# Patient Record
Sex: MALE | Race: BLACK OR AFRICAN AMERICAN | Employment: UNEMPLOYED | URBAN - METROPOLITAN AREA
[De-identification: names, ages, dates, MRNs, and addresses within clinical notes are randomized per-mention and may not be internally consistent; named-entity substitution may affect disease eponyms.]

---

## 2021-01-01 ENCOUNTER — APPOINTMENT (OUTPATIENT)
Dept: GENERAL RADIOLOGY | Age: 76
DRG: 469 | End: 2021-01-01
Attending: STUDENT IN AN ORGANIZED HEALTH CARE EDUCATION/TRAINING PROGRAM
Payer: MEDICAID

## 2021-01-01 ENCOUNTER — APPOINTMENT (OUTPATIENT)
Dept: GENERAL RADIOLOGY | Age: 76
DRG: 469 | End: 2021-01-01
Attending: EMERGENCY MEDICINE
Payer: MEDICAID

## 2021-01-01 ENCOUNTER — APPOINTMENT (OUTPATIENT)
Dept: GENERAL RADIOLOGY | Age: 76
DRG: 469 | End: 2021-01-01
Attending: INTERNAL MEDICINE
Payer: MEDICAID

## 2021-01-01 ENCOUNTER — HOSPITAL ENCOUNTER (INPATIENT)
Age: 76
LOS: 6 days | DRG: 469 | End: 2021-09-19
Attending: STUDENT IN AN ORGANIZED HEALTH CARE EDUCATION/TRAINING PROGRAM | Admitting: HOSPITALIST
Payer: MEDICAID

## 2021-01-01 ENCOUNTER — APPOINTMENT (OUTPATIENT)
Dept: GENERAL RADIOLOGY | Age: 76
DRG: 469 | End: 2021-01-01
Attending: HOSPITALIST
Payer: MEDICAID

## 2021-01-01 ENCOUNTER — APPOINTMENT (OUTPATIENT)
Dept: ULTRASOUND IMAGING | Age: 76
DRG: 469 | End: 2021-01-01
Attending: HOSPITALIST
Payer: MEDICAID

## 2021-01-01 ENCOUNTER — APPOINTMENT (OUTPATIENT)
Dept: ULTRASOUND IMAGING | Age: 76
DRG: 469 | End: 2021-01-01
Attending: INTERNAL MEDICINE
Payer: MEDICAID

## 2021-01-01 VITALS
SYSTOLIC BLOOD PRESSURE: 152 MMHG | HEART RATE: 101 BPM | HEIGHT: 68 IN | RESPIRATION RATE: 14 BRPM | BODY MASS INDEX: 14.87 KG/M2 | TEMPERATURE: 97.4 F | OXYGEN SATURATION: 74 % | WEIGHT: 98.11 LBS | DIASTOLIC BLOOD PRESSURE: 102 MMHG

## 2021-01-01 DIAGNOSIS — T68.XXXA HYPOTHERMIA, INITIAL ENCOUNTER: ICD-10-CM

## 2021-01-01 DIAGNOSIS — R62.7 FAILURE TO THRIVE IN ADULT: ICD-10-CM

## 2021-01-01 DIAGNOSIS — R74.01 TRANSAMINITIS: Primary | ICD-10-CM

## 2021-01-01 DIAGNOSIS — E16.2 HYPOGLYCEMIA: ICD-10-CM

## 2021-01-01 LAB
ALBUMIN SERPL-MCNC: 1.4 G/DL (ref 3.5–5)
ALBUMIN SERPL-MCNC: 1.5 G/DL (ref 3.5–5)
ALBUMIN SERPL-MCNC: 1.5 G/DL (ref 3.5–5)
ALBUMIN SERPL-MCNC: 1.8 G/DL (ref 3.5–5)
ALBUMIN SERPL-MCNC: 1.9 G/DL (ref 3.5–5)
ALBUMIN SERPL-MCNC: 2 G/DL (ref 3.5–5)
ALBUMIN/GLOB SERPL: 0.4 {RATIO} (ref 1.1–2.2)
ALBUMIN/GLOB SERPL: 0.5 {RATIO} (ref 1.1–2.2)
ALP SERPL-CCNC: 1186 U/L (ref 45–117)
ALP SERPL-CCNC: 1303 U/L (ref 45–117)
ALP SERPL-CCNC: 526 U/L (ref 45–117)
ALP SERPL-CCNC: 582 U/L (ref 45–117)
ALP SERPL-CCNC: 616 U/L (ref 45–117)
ALP SERPL-CCNC: 626 U/L (ref 45–117)
ALP SERPL-CCNC: 740 U/L (ref 45–117)
ALP SERPL-CCNC: 954 U/L (ref 45–117)
ALT SERPL-CCNC: 150 U/L (ref 12–78)
ALT SERPL-CCNC: 177 U/L (ref 12–78)
ALT SERPL-CCNC: 180 U/L (ref 12–78)
ALT SERPL-CCNC: 215 U/L (ref 12–78)
ALT SERPL-CCNC: 299 U/L (ref 12–78)
ALT SERPL-CCNC: 569 U/L (ref 12–78)
ALT SERPL-CCNC: 755 U/L (ref 12–78)
ALT SERPL-CCNC: 928 U/L (ref 12–78)
ANION GAP SERPL CALC-SCNC: 10 MMOL/L (ref 5–15)
ANION GAP SERPL CALC-SCNC: 10 MMOL/L (ref 5–15)
ANION GAP SERPL CALC-SCNC: 12 MMOL/L (ref 5–15)
ANION GAP SERPL CALC-SCNC: 13 MMOL/L (ref 5–15)
ANION GAP SERPL CALC-SCNC: 9 MMOL/L (ref 5–15)
APPEARANCE UR: ABNORMAL
AST SERPL-CCNC: 1284 U/L (ref 15–37)
AST SERPL-CCNC: 192 U/L (ref 15–37)
AST SERPL-CCNC: 261 U/L (ref 15–37)
AST SERPL-CCNC: 271 U/L (ref 15–37)
AST SERPL-CCNC: 333 U/L (ref 15–37)
AST SERPL-CCNC: 553 U/L (ref 15–37)
AST SERPL-CCNC: >2000 U/L (ref 15–37)
AST SERPL-CCNC: >2000 U/L (ref 15–37)
ATRIAL RATE: 67 BPM
BACTERIA SPEC CULT: ABNORMAL
BACTERIA SPEC CULT: ABNORMAL
BACTERIA SPEC CULT: NORMAL
BACTERIA URNS QL MICRO: ABNORMAL /HPF
BASOPHILS # BLD: 0 K/UL (ref 0–0.1)
BASOPHILS # BLD: 0 K/UL (ref 0–0.1)
BASOPHILS NFR BLD: 0 % (ref 0–1)
BASOPHILS NFR BLD: 0 % (ref 0–1)
BILIRUB DIRECT SERPL-MCNC: 0.2 MG/DL (ref 0–0.2)
BILIRUB DIRECT SERPL-MCNC: <0.1 MG/DL (ref 0–0.2)
BILIRUB SERPL-MCNC: 0.2 MG/DL (ref 0.2–1)
BILIRUB SERPL-MCNC: 0.4 MG/DL (ref 0.2–1)
BILIRUB SERPL-MCNC: 0.5 MG/DL (ref 0.2–1)
BILIRUB UR QL: NEGATIVE
BUN SERPL-MCNC: 122 MG/DL (ref 6–20)
BUN SERPL-MCNC: 122 MG/DL (ref 6–20)
BUN SERPL-MCNC: 126 MG/DL (ref 6–20)
BUN SERPL-MCNC: 128 MG/DL (ref 6–20)
BUN SERPL-MCNC: 128 MG/DL (ref 6–20)
BUN SERPL-MCNC: 132 MG/DL (ref 6–20)
BUN SERPL-MCNC: 133 MG/DL (ref 6–20)
BUN SERPL-MCNC: 134 MG/DL (ref 6–20)
BUN/CREAT SERPL: 28 (ref 12–20)
BUN/CREAT SERPL: 29 (ref 12–20)
BUN/CREAT SERPL: 30 (ref 12–20)
BUN/CREAT SERPL: 30 (ref 12–20)
BUN/CREAT SERPL: 31 (ref 12–20)
BUN/CREAT SERPL: 31 (ref 12–20)
C DIFF GDH STL QL: NEGATIVE
C DIFF TOX A+B STL QL IA: NEGATIVE
CALCIUM SERPL-MCNC: 6.1 MG/DL (ref 8.5–10.1)
CALCIUM SERPL-MCNC: 6.1 MG/DL (ref 8.5–10.1)
CALCIUM SERPL-MCNC: 6.2 MG/DL (ref 8.5–10.1)
CALCIUM SERPL-MCNC: 6.7 MG/DL (ref 8.5–10.1)
CALCIUM SERPL-MCNC: 6.8 MG/DL (ref 8.5–10.1)
CALCIUM SERPL-MCNC: 6.9 MG/DL (ref 8.5–10.1)
CALCULATED P AXIS, ECG09: 60 DEGREES
CALCULATED R AXIS, ECG10: 81 DEGREES
CALCULATED T AXIS, ECG11: 93 DEGREES
CC UR VC: ABNORMAL
CHLORIDE SERPL-SCNC: 114 MMOL/L (ref 97–108)
CHLORIDE SERPL-SCNC: 114 MMOL/L (ref 97–108)
CHLORIDE SERPL-SCNC: 115 MMOL/L (ref 97–108)
CHLORIDE SERPL-SCNC: 116 MMOL/L (ref 97–108)
CHLORIDE SERPL-SCNC: 118 MMOL/L (ref 97–108)
CHLORIDE SERPL-SCNC: 120 MMOL/L (ref 97–108)
CHLORIDE SERPL-SCNC: 124 MMOL/L (ref 97–108)
CHLORIDE SERPL-SCNC: 127 MMOL/L (ref 97–108)
CK SERPL-CCNC: 524 U/L (ref 39–308)
CO2 SERPL-SCNC: 15 MMOL/L (ref 21–32)
CO2 SERPL-SCNC: 17 MMOL/L (ref 21–32)
CO2 SERPL-SCNC: 18 MMOL/L (ref 21–32)
CO2 SERPL-SCNC: 20 MMOL/L (ref 21–32)
COLOR UR: ABNORMAL
COMMENT, HOLDF: NORMAL
CORTIS SERPL-MCNC: 35.3 UG/DL
COVID-19 RAPID TEST, COVR: NOT DETECTED
CREAT SERPL-MCNC: 4.01 MG/DL (ref 0.7–1.3)
CREAT SERPL-MCNC: 4.23 MG/DL (ref 0.7–1.3)
CREAT SERPL-MCNC: 4.27 MG/DL (ref 0.7–1.3)
CREAT SERPL-MCNC: 4.29 MG/DL (ref 0.7–1.3)
CREAT SERPL-MCNC: 4.39 MG/DL (ref 0.7–1.3)
CREAT SERPL-MCNC: 4.41 MG/DL (ref 0.7–1.3)
CREAT SERPL-MCNC: 4.42 MG/DL (ref 0.7–1.3)
CREAT SERPL-MCNC: 4.45 MG/DL (ref 0.7–1.3)
DIAGNOSIS, 93000: NORMAL
DIFFERENTIAL METHOD BLD: ABNORMAL
DIFFERENTIAL METHOD BLD: ABNORMAL
EOSINOPHIL # BLD: 0 K/UL (ref 0–0.4)
EOSINOPHIL # BLD: 0.1 K/UL (ref 0–0.4)
EOSINOPHIL NFR BLD: 0 % (ref 0–7)
EOSINOPHIL NFR BLD: 2 % (ref 0–7)
EPITH CASTS URNS QL MICRO: ABNORMAL /LPF
ERYTHROCYTE [DISTWIDTH] IN BLOOD BY AUTOMATED COUNT: 14.7 % (ref 11.5–14.5)
ERYTHROCYTE [DISTWIDTH] IN BLOOD BY AUTOMATED COUNT: 14.9 % (ref 11.5–14.5)
ERYTHROCYTE [DISTWIDTH] IN BLOOD BY AUTOMATED COUNT: 15.3 % (ref 11.5–14.5)
GLOBULIN SER CALC-MCNC: 3.7 G/DL (ref 2–4)
GLOBULIN SER CALC-MCNC: 3.8 G/DL (ref 2–4)
GLOBULIN SER CALC-MCNC: 3.9 G/DL (ref 2–4)
GLOBULIN SER CALC-MCNC: 4 G/DL (ref 2–4)
GLOBULIN SER CALC-MCNC: 4.1 G/DL (ref 2–4)
GLOBULIN SER CALC-MCNC: 4.4 G/DL (ref 2–4)
GLUCOSE BLD STRIP.AUTO-MCNC: 100 MG/DL (ref 65–117)
GLUCOSE BLD STRIP.AUTO-MCNC: 103 MG/DL (ref 65–117)
GLUCOSE BLD STRIP.AUTO-MCNC: 103 MG/DL (ref 65–117)
GLUCOSE BLD STRIP.AUTO-MCNC: 108 MG/DL (ref 65–117)
GLUCOSE BLD STRIP.AUTO-MCNC: 110 MG/DL (ref 65–117)
GLUCOSE BLD STRIP.AUTO-MCNC: 112 MG/DL (ref 65–117)
GLUCOSE BLD STRIP.AUTO-MCNC: 116 MG/DL (ref 65–117)
GLUCOSE BLD STRIP.AUTO-MCNC: 122 MG/DL (ref 65–117)
GLUCOSE BLD STRIP.AUTO-MCNC: 123 MG/DL (ref 65–117)
GLUCOSE BLD STRIP.AUTO-MCNC: 153 MG/DL (ref 65–117)
GLUCOSE BLD STRIP.AUTO-MCNC: 17 MG/DL (ref 65–117)
GLUCOSE BLD STRIP.AUTO-MCNC: 177 MG/DL (ref 65–117)
GLUCOSE BLD STRIP.AUTO-MCNC: 207 MG/DL (ref 65–117)
GLUCOSE BLD STRIP.AUTO-MCNC: 25 MG/DL (ref 65–117)
GLUCOSE BLD STRIP.AUTO-MCNC: 44 MG/DL (ref 65–117)
GLUCOSE BLD STRIP.AUTO-MCNC: 45 MG/DL (ref 65–117)
GLUCOSE BLD STRIP.AUTO-MCNC: 45 MG/DL (ref 65–117)
GLUCOSE BLD STRIP.AUTO-MCNC: 53 MG/DL (ref 65–117)
GLUCOSE BLD STRIP.AUTO-MCNC: 54 MG/DL (ref 65–117)
GLUCOSE BLD STRIP.AUTO-MCNC: 56 MG/DL (ref 65–117)
GLUCOSE BLD STRIP.AUTO-MCNC: 57 MG/DL (ref 65–117)
GLUCOSE BLD STRIP.AUTO-MCNC: 57 MG/DL (ref 65–117)
GLUCOSE BLD STRIP.AUTO-MCNC: 63 MG/DL (ref 65–117)
GLUCOSE BLD STRIP.AUTO-MCNC: 65 MG/DL (ref 65–117)
GLUCOSE BLD STRIP.AUTO-MCNC: 66 MG/DL (ref 65–117)
GLUCOSE BLD STRIP.AUTO-MCNC: 68 MG/DL (ref 65–117)
GLUCOSE BLD STRIP.AUTO-MCNC: 76 MG/DL (ref 65–117)
GLUCOSE BLD STRIP.AUTO-MCNC: 78 MG/DL (ref 65–117)
GLUCOSE BLD STRIP.AUTO-MCNC: 79 MG/DL (ref 65–117)
GLUCOSE BLD STRIP.AUTO-MCNC: 80 MG/DL (ref 65–117)
GLUCOSE BLD STRIP.AUTO-MCNC: 81 MG/DL (ref 65–117)
GLUCOSE BLD STRIP.AUTO-MCNC: 83 MG/DL (ref 65–117)
GLUCOSE BLD STRIP.AUTO-MCNC: 84 MG/DL (ref 65–117)
GLUCOSE BLD STRIP.AUTO-MCNC: 85 MG/DL (ref 65–117)
GLUCOSE BLD STRIP.AUTO-MCNC: 87 MG/DL (ref 65–117)
GLUCOSE BLD STRIP.AUTO-MCNC: 89 MG/DL (ref 65–117)
GLUCOSE BLD STRIP.AUTO-MCNC: 91 MG/DL (ref 65–117)
GLUCOSE BLD STRIP.AUTO-MCNC: 96 MG/DL (ref 65–117)
GLUCOSE BLD STRIP.AUTO-MCNC: 99 MG/DL (ref 65–117)
GLUCOSE BLD STRIP.AUTO-MCNC: 99 MG/DL (ref 65–117)
GLUCOSE SERPL-MCNC: 13 MG/DL (ref 65–100)
GLUCOSE SERPL-MCNC: 133 MG/DL (ref 65–100)
GLUCOSE SERPL-MCNC: 134 MG/DL (ref 65–100)
GLUCOSE SERPL-MCNC: 152 MG/DL (ref 65–100)
GLUCOSE SERPL-MCNC: 157 MG/DL (ref 65–100)
GLUCOSE SERPL-MCNC: 75 MG/DL (ref 65–100)
GLUCOSE SERPL-MCNC: 80 MG/DL (ref 65–100)
GLUCOSE SERPL-MCNC: 88 MG/DL (ref 65–100)
GLUCOSE UR STRIP.AUTO-MCNC: NEGATIVE MG/DL
HCT VFR BLD AUTO: 25.2 % (ref 36.6–50.3)
HCT VFR BLD AUTO: 26 % (ref 36.6–50.3)
HCT VFR BLD AUTO: 26.2 % (ref 36.6–50.3)
HCT VFR BLD AUTO: 26.3 % (ref 36.6–50.3)
HCT VFR BLD AUTO: 26.5 % (ref 36.6–50.3)
HGB BLD-MCNC: 8.2 G/DL (ref 12.1–17)
HGB BLD-MCNC: 8.5 G/DL (ref 12.1–17)
HGB BLD-MCNC: 8.6 G/DL (ref 12.1–17)
HGB BLD-MCNC: 8.6 G/DL (ref 12.1–17)
HGB BLD-MCNC: 8.7 G/DL (ref 12.1–17)
HGB UR QL STRIP: ABNORMAL
IMM GRANULOCYTES # BLD AUTO: 0 K/UL
IMM GRANULOCYTES # BLD AUTO: 0 K/UL
IMM GRANULOCYTES NFR BLD AUTO: 0 %
IMM GRANULOCYTES NFR BLD AUTO: 0 %
INR PPP: 1.2 (ref 0.9–1.1)
INTERPRETATION: NORMAL
KETONES UR QL STRIP.AUTO: NEGATIVE MG/DL
LACTATE BLD-SCNC: 0.57 MMOL/L (ref 0.4–2)
LACTATE SERPL-SCNC: 1 MMOL/L (ref 0.4–2)
LDH SERPL L TO P-CCNC: 1563 U/L (ref 85–241)
LEUKOCYTE ESTERASE UR QL STRIP.AUTO: ABNORMAL
LYMPHOCYTES # BLD: 0.3 K/UL (ref 0.8–3.5)
LYMPHOCYTES # BLD: 0.4 K/UL (ref 0.8–3.5)
LYMPHOCYTES NFR BLD: 5 % (ref 12–49)
LYMPHOCYTES NFR BLD: 6 % (ref 12–49)
MAGNESIUM SERPL-MCNC: 2 MG/DL (ref 1.6–2.4)
MAGNESIUM SERPL-MCNC: 2 MG/DL (ref 1.6–2.4)
MAGNESIUM SERPL-MCNC: 2.1 MG/DL (ref 1.6–2.4)
MAGNESIUM SERPL-MCNC: 2.4 MG/DL (ref 1.6–2.4)
MCH RBC QN AUTO: 27.6 PG (ref 26–34)
MCH RBC QN AUTO: 27.8 PG (ref 26–34)
MCH RBC QN AUTO: 27.9 PG (ref 26–34)
MCH RBC QN AUTO: 28 PG (ref 26–34)
MCH RBC QN AUTO: 28.1 PG (ref 26–34)
MCHC RBC AUTO-ENTMCNC: 32.5 G/DL (ref 30–36.5)
MCHC RBC AUTO-ENTMCNC: 32.5 G/DL (ref 30–36.5)
MCHC RBC AUTO-ENTMCNC: 32.7 G/DL (ref 30–36.5)
MCHC RBC AUTO-ENTMCNC: 32.8 G/DL (ref 30–36.5)
MCHC RBC AUTO-ENTMCNC: 33.1 G/DL (ref 30–36.5)
MCV RBC AUTO: 84.6 FL (ref 80–99)
MCV RBC AUTO: 84.8 FL (ref 80–99)
MCV RBC AUTO: 84.8 FL (ref 80–99)
MCV RBC AUTO: 85.2 FL (ref 80–99)
MCV RBC AUTO: 86.6 FL (ref 80–99)
MONOCYTES # BLD: 0.1 K/UL (ref 0–1)
MONOCYTES # BLD: 0.2 K/UL (ref 0–1)
MONOCYTES NFR BLD: 2 % (ref 5–13)
MONOCYTES NFR BLD: 3 % (ref 5–13)
NEUTS BAND NFR BLD MANUAL: 2 % (ref 0–6)
NEUTS BAND NFR BLD MANUAL: 4 % (ref 0–6)
NEUTS SEG # BLD: 4.9 K/UL (ref 1.8–8)
NEUTS SEG # BLD: 6.2 K/UL (ref 1.8–8)
NEUTS SEG NFR BLD: 87 % (ref 32–75)
NEUTS SEG NFR BLD: 89 % (ref 32–75)
NITRITE UR QL STRIP.AUTO: POSITIVE
NRBC # BLD: 0 K/UL (ref 0–0.01)
NRBC # BLD: 0.03 K/UL (ref 0–0.01)
NRBC # BLD: 0.04 K/UL (ref 0–0.01)
NRBC # BLD: 0.05 K/UL (ref 0–0.01)
NRBC # BLD: 0.06 K/UL (ref 0–0.01)
NRBC BLD-RTO: 0 PER 100 WBC
NRBC BLD-RTO: 0.2 PER 100 WBC
NRBC BLD-RTO: 0.5 PER 100 WBC
NRBC BLD-RTO: 0.6 PER 100 WBC
NRBC BLD-RTO: 1.1 PER 100 WBC
P-R INTERVAL, ECG05: 184 MS
PH UR STRIP: 7 [PH] (ref 5–8)
PHOSPHATE SERPL-MCNC: 6.8 MG/DL (ref 2.6–4.7)
PHOSPHATE SERPL-MCNC: 7 MG/DL (ref 2.6–4.7)
PHOSPHATE SERPL-MCNC: 7.2 MG/DL (ref 2.6–4.7)
PHOSPHATE SERPL-MCNC: 7.3 MG/DL (ref 2.6–4.7)
PHOSPHATE SERPL-MCNC: 8.5 MG/DL (ref 2.6–4.7)
PLATELET # BLD AUTO: 45 K/UL (ref 150–400)
PLATELET # BLD AUTO: 46 K/UL (ref 150–400)
PLATELET # BLD AUTO: 50 K/UL (ref 150–400)
PLATELET # BLD AUTO: 66 K/UL (ref 150–400)
PLATELET # BLD AUTO: 66 K/UL (ref 150–400)
PLATELET COMMENTS,PCOM: ABNORMAL
PMV BLD AUTO: ABNORMAL FL (ref 8.9–12.9)
PMV BLD AUTO: ABNORMAL FL (ref 8.9–12.9)
POTASSIUM SERPL-SCNC: 3.4 MMOL/L (ref 3.5–5.1)
POTASSIUM SERPL-SCNC: 3.6 MMOL/L (ref 3.5–5.1)
POTASSIUM SERPL-SCNC: 3.6 MMOL/L (ref 3.5–5.1)
POTASSIUM SERPL-SCNC: 3.7 MMOL/L (ref 3.5–5.1)
POTASSIUM SERPL-SCNC: 4 MMOL/L (ref 3.5–5.1)
POTASSIUM SERPL-SCNC: 4.8 MMOL/L (ref 3.5–5.1)
PROT SERPL-MCNC: 5.1 G/DL (ref 6.4–8.2)
PROT SERPL-MCNC: 5.3 G/DL (ref 6.4–8.2)
PROT SERPL-MCNC: 5.4 G/DL (ref 6.4–8.2)
PROT SERPL-MCNC: 5.9 G/DL (ref 6.4–8.2)
PROT SERPL-MCNC: 6 G/DL (ref 6.4–8.2)
PROT SERPL-MCNC: 6.3 G/DL (ref 6.4–8.2)
PROT UR STRIP-MCNC: 300 MG/DL
PROTHROMBIN TIME: 12.4 SEC (ref 9–11.1)
Q-T INTERVAL, ECG07: 450 MS
QRS DURATION, ECG06: 78 MS
QTC CALCULATION (BEZET), ECG08: 475 MS
RBC # BLD AUTO: 2.97 M/UL (ref 4.1–5.7)
RBC # BLD AUTO: 3.05 M/UL (ref 4.1–5.7)
RBC # BLD AUTO: 3.06 M/UL (ref 4.1–5.7)
RBC # BLD AUTO: 3.09 M/UL (ref 4.1–5.7)
RBC # BLD AUTO: 3.11 M/UL (ref 4.1–5.7)
RBC #/AREA URNS HPF: ABNORMAL /HPF (ref 0–5)
RBC MORPH BLD: ABNORMAL
SAMPLES BEING HELD,HOLD: NORMAL
SERVICE CMNT-IMP: ABNORMAL
SERVICE CMNT-IMP: NORMAL
SODIUM SERPL-SCNC: 144 MMOL/L (ref 136–145)
SODIUM SERPL-SCNC: 146 MMOL/L (ref 136–145)
SODIUM SERPL-SCNC: 148 MMOL/L (ref 136–145)
SODIUM SERPL-SCNC: 151 MMOL/L (ref 136–145)
SODIUM SERPL-SCNC: 153 MMOL/L (ref 136–145)
SODIUM SERPL-SCNC: 157 MMOL/L (ref 136–145)
SOURCE, COVRS: NORMAL
SP GR UR REFRACTOMETRY: 1.01 (ref 1–1.03)
TROPONIN I SERPL-MCNC: <0.05 NG/ML
TSH SERPL DL<=0.05 MIU/L-ACNC: 11 UIU/ML (ref 0.36–3.74)
UR CULT HOLD, URHOLD: NORMAL
UROBILINOGEN UR QL STRIP.AUTO: 0.2 EU/DL (ref 0.2–1)
VENTRICULAR RATE, ECG03: 67 BPM
WBC # BLD AUTO: 10.2 K/UL (ref 4.1–11.1)
WBC # BLD AUTO: 12.5 K/UL (ref 4.1–11.1)
WBC # BLD AUTO: 13.3 K/UL (ref 4.1–11.1)
WBC # BLD AUTO: 5.4 K/UL (ref 4.1–11.1)
WBC # BLD AUTO: 6.8 K/UL (ref 4.1–11.1)
WBC URNS QL MICRO: ABNORMAL /HPF (ref 0–4)

## 2021-01-01 PROCEDURE — 84100 ASSAY OF PHOSPHORUS: CPT

## 2021-01-01 PROCEDURE — 65660000001 HC RM ICU INTERMED STEPDOWN

## 2021-01-01 PROCEDURE — 71045 X-RAY EXAM CHEST 1 VIEW: CPT

## 2021-01-01 PROCEDURE — 85027 COMPLETE CBC AUTOMATED: CPT

## 2021-01-01 PROCEDURE — 74011000250 HC RX REV CODE- 250: Performed by: INTERNAL MEDICINE

## 2021-01-01 PROCEDURE — 99285 EMERGENCY DEPT VISIT HI MDM: CPT

## 2021-01-01 PROCEDURE — 83735 ASSAY OF MAGNESIUM: CPT

## 2021-01-01 PROCEDURE — 83615 LACTATE (LD) (LDH) ENZYME: CPT

## 2021-01-01 PROCEDURE — 74011250636 HC RX REV CODE- 250/636: Performed by: HOSPITALIST

## 2021-01-01 PROCEDURE — 80053 COMPREHEN METABOLIC PANEL: CPT

## 2021-01-01 PROCEDURE — 74011000258 HC RX REV CODE- 258: Performed by: INTERNAL MEDICINE

## 2021-01-01 PROCEDURE — 36415 COLL VENOUS BLD VENIPUNCTURE: CPT

## 2021-01-01 PROCEDURE — 74011000250 HC RX REV CODE- 250: Performed by: FAMILY MEDICINE

## 2021-01-01 PROCEDURE — 84443 ASSAY THYROID STIM HORMONE: CPT

## 2021-01-01 PROCEDURE — 76770 US EXAM ABDO BACK WALL COMP: CPT

## 2021-01-01 PROCEDURE — 99232 SBSQ HOSP IP/OBS MODERATE 35: CPT | Performed by: NURSE PRACTITIONER

## 2021-01-01 PROCEDURE — 65270000029 HC RM PRIVATE

## 2021-01-01 PROCEDURE — 82962 GLUCOSE BLOOD TEST: CPT

## 2021-01-01 PROCEDURE — 80076 HEPATIC FUNCTION PANEL: CPT

## 2021-01-01 PROCEDURE — 74011000258 HC RX REV CODE- 258: Performed by: HOSPITALIST

## 2021-01-01 PROCEDURE — 74011250636 HC RX REV CODE- 250/636: Performed by: INTERNAL MEDICINE

## 2021-01-01 PROCEDURE — 83605 ASSAY OF LACTIC ACID: CPT

## 2021-01-01 PROCEDURE — 74011250636 HC RX REV CODE- 250/636: Performed by: STUDENT IN AN ORGANIZED HEALTH CARE EDUCATION/TRAINING PROGRAM

## 2021-01-01 PROCEDURE — 87324 CLOSTRIDIUM AG IA: CPT

## 2021-01-01 PROCEDURE — 85025 COMPLETE CBC W/AUTO DIFF WBC: CPT

## 2021-01-01 PROCEDURE — 85610 PROTHROMBIN TIME: CPT

## 2021-01-01 PROCEDURE — 84484 ASSAY OF TROPONIN QUANT: CPT

## 2021-01-01 PROCEDURE — 74011250637 HC RX REV CODE- 250/637: Performed by: INTERNAL MEDICINE

## 2021-01-01 PROCEDURE — 74011000250 HC RX REV CODE- 250: Performed by: NURSE PRACTITIONER

## 2021-01-01 PROCEDURE — 96365 THER/PROPH/DIAG IV INF INIT: CPT

## 2021-01-01 PROCEDURE — 3E0436Z INTRODUCTION OF NUTRITIONAL SUBSTANCE INTO CENTRAL VEIN, PERCUTANEOUS APPROACH: ICD-10-PCS | Performed by: INTERNAL MEDICINE

## 2021-01-01 PROCEDURE — 80069 RENAL FUNCTION PANEL: CPT

## 2021-01-01 PROCEDURE — 87040 BLOOD CULTURE FOR BACTERIA: CPT

## 2021-01-01 PROCEDURE — 87635 SARS-COV-2 COVID-19 AMP PRB: CPT

## 2021-01-01 PROCEDURE — 77010033678 HC OXYGEN DAILY

## 2021-01-01 PROCEDURE — 87077 CULTURE AEROBIC IDENTIFY: CPT

## 2021-01-01 PROCEDURE — 96376 TX/PRO/DX INJ SAME DRUG ADON: CPT

## 2021-01-01 PROCEDURE — 82533 TOTAL CORTISOL: CPT

## 2021-01-01 PROCEDURE — 93005 ELECTROCARDIOGRAM TRACING: CPT

## 2021-01-01 PROCEDURE — 74011000250 HC RX REV CODE- 250: Performed by: HOSPITALIST

## 2021-01-01 PROCEDURE — 76705 ECHO EXAM OF ABDOMEN: CPT

## 2021-01-01 PROCEDURE — 99223 1ST HOSP IP/OBS HIGH 75: CPT | Performed by: NURSE PRACTITIONER

## 2021-01-01 PROCEDURE — 74011000250 HC RX REV CODE- 250: Performed by: STUDENT IN AN ORGANIZED HEALTH CARE EDUCATION/TRAINING PROGRAM

## 2021-01-01 PROCEDURE — 77030037877 HC DRSG MEPILEX >48IN BORD MOLN -A

## 2021-01-01 PROCEDURE — 87086 URINE CULTURE/COLONY COUNT: CPT

## 2021-01-01 PROCEDURE — 51702 INSERT TEMP BLADDER CATH: CPT

## 2021-01-01 PROCEDURE — 81001 URINALYSIS AUTO W/SCOPE: CPT

## 2021-01-01 PROCEDURE — 75810000455 HC PLCMT CENT VENOUS CATH LVL 2 5182

## 2021-01-01 PROCEDURE — 87186 SC STD MICRODIL/AGAR DIL: CPT

## 2021-01-01 PROCEDURE — 82550 ASSAY OF CK (CPK): CPT

## 2021-01-01 PROCEDURE — 96366 THER/PROPH/DIAG IV INF ADDON: CPT

## 2021-01-01 RX ORDER — MAGNESIUM SULFATE 100 %
4 CRYSTALS MISCELLANEOUS AS NEEDED
Status: DISCONTINUED | OUTPATIENT
Start: 2021-01-01 | End: 2021-01-01 | Stop reason: HOSPADM

## 2021-01-01 RX ORDER — DEXTROSE MONOHYDRATE 50 MG/ML
125 INJECTION, SOLUTION INTRAVENOUS CONTINUOUS
Status: DISCONTINUED | OUTPATIENT
Start: 2021-01-01 | End: 2021-01-01

## 2021-01-01 RX ORDER — DEXTROSE 50 % IN WATER (D50W) INTRAVENOUS SYRINGE
25
Status: DISCONTINUED | OUTPATIENT
Start: 2021-01-01 | End: 2021-01-01

## 2021-01-01 RX ORDER — ONDANSETRON 2 MG/ML
4 INJECTION INTRAMUSCULAR; INTRAVENOUS
Status: DISCONTINUED | OUTPATIENT
Start: 2021-01-01 | End: 2021-01-01 | Stop reason: HOSPADM

## 2021-01-01 RX ORDER — HYOSCYAMINE SULFATE 0.12 MG/ML
125 LIQUID ORAL
Status: DISCONTINUED | OUTPATIENT
Start: 2021-01-01 | End: 2021-01-01 | Stop reason: HOSPADM

## 2021-01-01 RX ORDER — DEXTROSE 50 % IN WATER (D50W) INTRAVENOUS SYRINGE
Status: DISPENSED
Start: 2021-01-01 | End: 2021-01-01

## 2021-01-01 RX ORDER — DEXTROSE 50 % IN WATER (D50W) INTRAVENOUS SYRINGE
12.5-25 AS NEEDED
Status: DISCONTINUED | OUTPATIENT
Start: 2021-01-01 | End: 2021-01-01 | Stop reason: HOSPADM

## 2021-01-01 RX ORDER — ATORVASTATIN CALCIUM 80 MG/1
80 TABLET, FILM COATED ORAL DAILY
COMMUNITY

## 2021-01-01 RX ORDER — DEXTROSE MONOHYDRATE 50 MG/ML
50 INJECTION, SOLUTION INTRAVENOUS CONTINUOUS
Status: DISCONTINUED | OUTPATIENT
Start: 2021-01-01 | End: 2021-01-01

## 2021-01-01 RX ORDER — ONDANSETRON 4 MG/1
4 TABLET, ORALLY DISINTEGRATING ORAL
Status: DISCONTINUED | OUTPATIENT
Start: 2021-01-01 | End: 2021-01-01 | Stop reason: HOSPADM

## 2021-01-01 RX ORDER — DEXTROSE MONOHYDRATE 100 MG/ML
50 INJECTION, SOLUTION INTRAVENOUS CONTINUOUS
Status: DISCONTINUED | OUTPATIENT
Start: 2021-01-01 | End: 2021-01-01

## 2021-01-01 RX ORDER — HYDRALAZINE HYDROCHLORIDE 25 MG/1
25 TABLET, FILM COATED ORAL 3 TIMES DAILY
COMMUNITY

## 2021-01-01 RX ORDER — TAMSULOSIN HYDROCHLORIDE 0.4 MG/1
0.4 CAPSULE ORAL
COMMUNITY

## 2021-01-01 RX ORDER — FAMOTIDINE 10 MG/1
10 TABLET ORAL
COMMUNITY

## 2021-01-01 RX ORDER — LORATADINE 10 MG/1
10 TABLET ORAL
COMMUNITY

## 2021-01-01 RX ORDER — ACETAMINOPHEN 650 MG/1
650 SUPPOSITORY RECTAL
Status: DISCONTINUED | OUTPATIENT
Start: 2021-01-01 | End: 2021-01-01 | Stop reason: HOSPADM

## 2021-01-01 RX ORDER — SODIUM BICARBONATE IN D5W 150/1000ML
PLASTIC BAG, INJECTION (ML) INTRAVENOUS CONTINUOUS
Status: DISCONTINUED | OUTPATIENT
Start: 2021-01-01 | End: 2021-01-01 | Stop reason: HOSPADM

## 2021-01-01 RX ORDER — DEXTROSE 50 % IN WATER (D50W) INTRAVENOUS SYRINGE
25
Status: COMPLETED | OUTPATIENT
Start: 2021-01-01 | End: 2021-01-01

## 2021-01-01 RX ORDER — ISOSORBIDE DINITRATE 20 MG/1
20 TABLET ORAL 3 TIMES DAILY
COMMUNITY

## 2021-01-01 RX ORDER — POLYETHYLENE GLYCOL 3350 17 G/17G
17 POWDER, FOR SOLUTION ORAL DAILY PRN
Status: DISCONTINUED | OUTPATIENT
Start: 2021-01-01 | End: 2021-01-01 | Stop reason: HOSPADM

## 2021-01-01 RX ORDER — CALCIUM GLUCONATE 94 MG/ML
2 INJECTION, SOLUTION INTRAVENOUS ONCE
Status: DISCONTINUED | OUTPATIENT
Start: 2021-01-01 | End: 2021-01-01 | Stop reason: SDUPTHER

## 2021-01-01 RX ORDER — ACETAMINOPHEN 325 MG/1
650 TABLET ORAL
Status: DISCONTINUED | OUTPATIENT
Start: 2021-01-01 | End: 2021-01-01 | Stop reason: HOSPADM

## 2021-01-01 RX ORDER — CALCIUM GLUCONATE 20 MG/ML
2 INJECTION, SOLUTION INTRAVENOUS ONCE
Status: COMPLETED | OUTPATIENT
Start: 2021-01-01 | End: 2021-01-01

## 2021-01-01 RX ORDER — LANOLIN ALCOHOL/MO/W.PET/CERES
1000 CREAM (GRAM) TOPICAL DAILY
COMMUNITY

## 2021-01-01 RX ORDER — DEXTROSE 50 % IN WATER (D50W) INTRAVENOUS SYRINGE
25 ONCE
Status: COMPLETED | OUTPATIENT
Start: 2021-01-01 | End: 2021-01-01

## 2021-01-01 RX ORDER — MAGNESIUM SULFATE 100 %
4 CRYSTALS MISCELLANEOUS AS NEEDED
Status: DISCONTINUED | OUTPATIENT
Start: 2021-01-01 | End: 2021-01-01

## 2021-01-01 RX ORDER — FUROSEMIDE 20 MG/1
20 TABLET ORAL EVERY OTHER DAY
COMMUNITY

## 2021-01-01 RX ORDER — SODIUM CHLORIDE 0.9 % (FLUSH) 0.9 %
5-40 SYRINGE (ML) INJECTION AS NEEDED
Status: DISCONTINUED | OUTPATIENT
Start: 2021-01-01 | End: 2021-01-01 | Stop reason: HOSPADM

## 2021-01-01 RX ORDER — CALCIUM GLUCONATE 94 MG/ML
2 INJECTION, SOLUTION INTRAVENOUS ONCE
Status: DISCONTINUED | OUTPATIENT
Start: 2021-01-01 | End: 2021-01-01

## 2021-01-01 RX ORDER — GUAIFENESIN 100 MG/5ML
81 LIQUID (ML) ORAL DAILY
COMMUNITY

## 2021-01-01 RX ORDER — AMLODIPINE BESYLATE 10 MG/1
10 TABLET ORAL DAILY
COMMUNITY

## 2021-01-01 RX ORDER — DEXTROSE 50 % IN WATER (D50W) INTRAVENOUS SYRINGE
12.5-25 AS NEEDED
Status: DISCONTINUED | OUTPATIENT
Start: 2021-01-01 | End: 2021-01-01

## 2021-01-01 RX ORDER — METOPROLOL TARTRATE 50 MG/1
50 TABLET ORAL 2 TIMES DAILY
COMMUNITY

## 2021-01-01 RX ORDER — GUAIFENESIN 200 MG/1
200 TABLET ORAL
COMMUNITY

## 2021-01-01 RX ORDER — LANOLIN ALCOHOL/MO/W.PET/CERES
325 CREAM (GRAM) TOPICAL
COMMUNITY

## 2021-01-01 RX ORDER — CETIRIZINE HYDROCHLORIDE 5 MG/1
5 TABLET ORAL
COMMUNITY

## 2021-01-01 RX ORDER — SODIUM CHLORIDE 0.9 % (FLUSH) 0.9 %
5-40 SYRINGE (ML) INJECTION EVERY 8 HOURS
Status: DISCONTINUED | OUTPATIENT
Start: 2021-01-01 | End: 2021-01-01 | Stop reason: HOSPADM

## 2021-01-01 RX ADMIN — PIPERACILLIN AND TAZOBACTAM 3.38 G: 3; .375 INJECTION, POWDER, LYOPHILIZED, FOR SOLUTION INTRAVENOUS at 00:57

## 2021-01-01 RX ADMIN — DEXTROSE MONOHYDRATE 25 G: 25 INJECTION, SOLUTION INTRAVENOUS at 06:33

## 2021-01-01 RX ADMIN — DEXTROSE MONOHYDRATE 125 ML/HR: 50 INJECTION, SOLUTION INTRAVENOUS at 15:50

## 2021-01-01 RX ADMIN — GLUCAGON HYDROCHLORIDE 1 MG: KIT at 14:05

## 2021-01-01 RX ADMIN — DEXTROSE MONOHYDRATE 50 ML/HR: 100 INJECTION, SOLUTION INTRAVENOUS at 02:24

## 2021-01-01 RX ADMIN — CALCIUM GLUCONATE 2 G: 20 INJECTION, SOLUTION INTRAVENOUS at 16:51

## 2021-01-01 RX ADMIN — DEXTROSE MONOHYDRATE 25 G: 25 INJECTION, SOLUTION INTRAVENOUS at 21:58

## 2021-01-01 RX ADMIN — Medication 10 ML: at 01:10

## 2021-01-01 RX ADMIN — Medication 10 ML: at 23:09

## 2021-01-01 RX ADMIN — PIPERACILLIN AND TAZOBACTAM 3.38 G: 3; .375 INJECTION, POWDER, LYOPHILIZED, FOR SOLUTION INTRAVENOUS at 00:47

## 2021-01-01 RX ADMIN — Medication 10 ML: at 14:55

## 2021-01-01 RX ADMIN — DEXTROSE MONOHYDRATE 125 ML/HR: 50 INJECTION, SOLUTION INTRAVENOUS at 01:09

## 2021-01-01 RX ADMIN — DIPHENHYDRAMINE HYDROCHLORIDE AND LIDOCAINE HYDROCHLORIDE AND ALUMINUM HYDROXIDE AND MAGNESIUM HYDRO 5 ML: KIT at 23:08

## 2021-01-01 RX ADMIN — CALCIUM GLUCONATE 2 G: 20 INJECTION, SOLUTION INTRAVENOUS at 09:35

## 2021-01-01 RX ADMIN — DEXTROSE MONOHYDRATE 25 G: 25 INJECTION, SOLUTION INTRAVENOUS at 18:55

## 2021-01-01 RX ADMIN — DEXTROSE MONOHYDRATE 50 ML/HR: 100 INJECTION, SOLUTION INTRAVENOUS at 04:15

## 2021-01-01 RX ADMIN — DEXTROSE MONOHYDRATE 100 ML/HR: 50 INJECTION, SOLUTION INTRAVENOUS at 17:52

## 2021-01-01 RX ADMIN — DEXTROSE MONOHYDRATE 25 G: 25 INJECTION, SOLUTION INTRAVENOUS at 15:40

## 2021-01-01 RX ADMIN — Medication 10 ML: at 02:27

## 2021-01-01 RX ADMIN — Medication 8 ML: at 14:00

## 2021-01-01 RX ADMIN — CALCIUM GLUCONATE 2 G: 20 INJECTION, SOLUTION INTRAVENOUS at 18:23

## 2021-01-01 RX ADMIN — DEXTROSE MONOHYDRATE 50 ML/HR: 50 INJECTION, SOLUTION INTRAVENOUS at 14:54

## 2021-01-01 RX ADMIN — PIPERACILLIN AND TAZOBACTAM 3.38 G: 3; .375 INJECTION, POWDER, LYOPHILIZED, FOR SOLUTION INTRAVENOUS at 12:43

## 2021-01-01 RX ADMIN — PIPERACILLIN AND TAZOBACTAM 3.38 G: 3; .375 INJECTION, POWDER, LYOPHILIZED, FOR SOLUTION INTRAVENOUS at 02:17

## 2021-01-01 RX ADMIN — CEFTRIAXONE SODIUM 1 G: 1 INJECTION, POWDER, FOR SOLUTION INTRAMUSCULAR; INTRAVENOUS at 16:12

## 2021-01-01 RX ADMIN — Medication 10 ML: at 13:58

## 2021-01-01 RX ADMIN — GLUCAGON HYDROCHLORIDE 1 MG: KIT at 14:46

## 2021-01-01 RX ADMIN — Medication: at 18:02

## 2021-01-01 RX ADMIN — CALCIUM GLUCONATE: 94 INJECTION, SOLUTION INTRAVENOUS at 19:31

## 2021-01-01 RX ADMIN — DEXTROSE MONOHYDRATE 25 G: 25 INJECTION, SOLUTION INTRAVENOUS at 06:09

## 2021-01-01 RX ADMIN — Medication: at 18:22

## 2021-01-01 RX ADMIN — GLUCAGON HYDROCHLORIDE 1 MG: KIT at 15:22

## 2021-01-01 RX ADMIN — Medication 10 ML: at 07:08

## 2021-01-01 RX ADMIN — PIPERACILLIN AND TAZOBACTAM 3.38 G: 3; .375 INJECTION, POWDER, LYOPHILIZED, FOR SOLUTION INTRAVENOUS at 00:42

## 2021-01-01 RX ADMIN — DEXTROSE MONOHYDRATE 100 ML/HR: 50 INJECTION, SOLUTION INTRAVENOUS at 06:14

## 2021-01-01 RX ADMIN — CEFTRIAXONE SODIUM 1 G: 1 INJECTION, POWDER, FOR SOLUTION INTRAMUSCULAR; INTRAVENOUS at 16:24

## 2021-01-01 RX ADMIN — DEXTROSE MONOHYDRATE 50 ML/HR: 100 INJECTION, SOLUTION INTRAVENOUS at 16:20

## 2021-01-01 RX ADMIN — Medication 10 ML: at 07:00

## 2021-01-01 RX ADMIN — Medication: at 19:30

## 2021-01-01 RX ADMIN — PIPERACILLIN AND TAZOBACTAM 3.38 G: 3; .375 INJECTION, POWDER, LYOPHILIZED, FOR SOLUTION INTRAVENOUS at 12:29

## 2021-01-01 RX ADMIN — DEXTROSE MONOHYDRATE 25 G: 25 INJECTION, SOLUTION INTRAVENOUS at 05:57

## 2021-01-01 RX ADMIN — CALCIUM GLUCONATE 2 G: 20 INJECTION, SOLUTION INTRAVENOUS at 13:56

## 2021-01-01 RX ADMIN — PIPERACILLIN AND TAZOBACTAM 3.38 G: 3; .375 INJECTION, POWDER, LYOPHILIZED, FOR SOLUTION INTRAVENOUS at 13:56

## 2021-01-01 RX ADMIN — GLUCAGON HYDROCHLORIDE 1 MG: KIT at 12:16

## 2021-01-01 RX ADMIN — DEXTROSE MONOHYDRATE 100 ML/HR: 50 INJECTION, SOLUTION INTRAVENOUS at 02:21

## 2021-01-01 RX ADMIN — DEXTROSE MONOHYDRATE 125 ML/HR: 50 INJECTION, SOLUTION INTRAVENOUS at 20:45

## 2021-01-01 RX ADMIN — Medication 10 ML: at 06:51

## 2021-09-13 PROBLEM — T68.XXXA HYPOTHERMIA: Status: ACTIVE | Noted: 2021-01-01

## 2021-09-13 PROBLEM — G93.41 ACUTE METABOLIC ENCEPHALOPATHY: Status: ACTIVE | Noted: 2021-01-01

## 2021-09-13 PROBLEM — R74.8 ELEVATED LIVER ENZYMES: Status: ACTIVE | Noted: 2021-01-01

## 2021-09-13 PROBLEM — E16.2 HYPOGLYCEMIA: Status: ACTIVE | Noted: 2021-01-01

## 2021-09-13 NOTE — PROGRESS NOTES
Left message on [facility] answering machine to contact VAT to obtain consent for PICC line placement.

## 2021-09-13 NOTE — ED NOTES
Bedside and Verbal shift change report given to Yeison Wilder RN and Jose Rodriguez (oncoming nurse) by Nitin Cortes RN (offgoing nurse). Report included the following information SBAR, ED Summary, MAR and Recent Results.

## 2021-09-13 NOTE — PROGRESS NOTES
VAT received message consent obtained for PICC. Call to ED COMFORT Walsh reports patient has IJ access. PICC line dc'd.

## 2021-09-13 NOTE — ED NOTES
Patient is declining and the blood sugar continues to decrease. Called daughter back and obtained telephone consent for cental line placement.

## 2021-09-13 NOTE — ED NOTES
Dr. Tyson Friday called back at this time and received verbal order with readback for patient to have PICC line inserted.

## 2021-09-13 NOTE — PROGRESS NOTES
9/13/2021 -   TRANSITIONS OF CARE PLAN:   1. RUR: RRAT: 8  2. DESTINATION: TBD: possible discharge to family home  3. TRANSPORT: BLS  4. NEEDS FOR DISCHARGE: New Orders for [facility]  5. ANTICIPATED FOLLOW UPS: PCP, Nephro, Pulmonary  6. ONGOING INPATIENT NEEDS: D5, Annel Hugger support, NPO, Cultures Pending, Stool Studies Pending, ABD US, Nephro Consult, Pulmonary Consult, Intensivist Consult, Palliative Consult, may need a Hepatology Consult, would benefit from PT/OT/SLP evals    Anticipated Discharge is: Greater than 48 Hours    Reason for Admission:  Hypothermia, Hypoglycemia, Elevated LFTs, Acute Metabolic Encephalopathy                    RUR Score:      RRAT: 8               Plan for utilizing home health:   New orders for home hospice via [facility]. PCP: First and Last name:  No primary care provider on file. Dr. Jeff Mcguire   Name of Practice: [facility]   Are you a current patient: Yes/No: Yes   Approximate date of last visit: 3 weeks   Can you participate in a virtual visit with your PCP:                     Current Advanced Directive/Advance Care Plan: Partial Code      Healthcare Decision Maker:   Click here to complete [address] including selection of the Healthcare Decision Maker Relationship (ie \"Primary\")           DaughterJairon" [facility]: [phone]  Jona Minus:                   Transition of Care Plan:   CM notes CM Consult for Assistance With Locating Patient's Digna Fanning. CM notes that Attending has been in communication with patient's daughter Estefania Avelar: [phone]). CM contacted the daughter by phone. Peratient lives with daughter in a 2 story home, 4 exterior steps, 2nd floor bedroom, 12 interior steps. Patient's daughter assists with ADLs. DME includes: hospital bed, wheel chair, bedside commode, shower stool, glucometer, top dentures. Patient has no hx of Rehab.   [facility] that the daughter cannot recall at this time. [place] [facility] [place] [facility]  Patient has not received a COVID vaccine. Patient's daughter identified that the patient also has a son that lives locally, but the son is not actively involved in patient's care. Patient's daughter believes that [facility] has an AMD on the patient but cannot confirm for certain. CM contacted patient's PCP (Dr. Mere Caraballo, [facility]: [phone]) and nursing to return call to CM with clarification re: patient's [address]. Medicare pt has received, reviewed, and signed IM letter informing them of their right to appeal the discharge. Signed copy has been placed on pt bedside chart. Disposition is TBD, dependent on progression    Care Management Interventions  PCP Verified by CM: Yes  Palliative Care Criteria Met (RRAT>21 & CHF Dx)?: No  Mode of Transport at Discharge: S  Transition of Care Consult (CM Consult): Discharge Planning, [address]: No  MyChart Signup: No  Discharge Durable Medical Equipment: No (has: hospital bed, wheel chair, bedside commode, shower stool, glucometer, top dentures)  Health Maintenance Reviewed: Yes (CM spoke with patient's daughter by phone)  Physical Therapy Consult: No  Occupational Therapy Consult: No  Speech Therapy Consult: No  Support Systems: Child(iona)  Confirm Follow Up Transport: Family (assisted in ADLs by daughter)  [address] Provided?: No  Discharge Location  Discharge Placement: Unable to determine at this time (lives with daughter in a 2 story home, 4 exterior steps, 12 interior steps, 2nd floor bedroom)     11:10 -   CM received call from Geoffrey Watson [facility] Talat Carrillo: [phone] who indicated that the patient has been under hospice care since 8/28. Patient's recent hospitalization at [facility] was due to hypothermia and dysphasia leading to the patient not eating. Per Damaso Laughter, patient's temp was at 95.7 at the time of hospice admisison.  Rollen Laughter identified that patient's family required frequent reeducation re: the fact that patient will likely continue not taking PO and ongoing decline due to patient likely nearing end of life. Per Twyla Urbano, patient will be taken off of the agency's caseload at this time due to hospital admission. CM discussed the above with nursing and attending. Patient currently does not have access, temp continues to drop, and glucose continues to drop. CM requested for attending to request the palliative consult as a STAT Consult. CM to continue following to KENDRICK GUALBERTO, including potential initiation of hospice at home with [facility].   CRM: Ruddy Sharpe, MPH, [address]; Z: [phone]

## 2021-09-13 NOTE — ED PROVIDER NOTES
Patient is a 79-year-old male presenting the emergency department after family called 911 for patient having failure to thrive on arrival they noticed patient's blood sugar was 24. EMS states that patient's vitals were stable in route O2 sats were in the 90s when they arrived to the ED patient's O2 sats dropped into the 80s patient became more lethargic lethargic, unresponsive this started bagging the patient with a BVM. Patient was transferred to ED stretcher O2 sats were greater than 90 still unresponsive. Paperwork provided by EMS shows that patient is a DNR. Patient was seen at [address] [place] [address] on June 11 and diagnosis is on patient's discharge paperwork shows that he has hypokalemia, hypocalcemia, hypomagnesemia, dysphagia, aspiration, dementia, hypertension, hypertensive encephalopathy, Parkinson's disease. No past medical history on file. No past surgical history on file. No family history on file. Social History     Socioeconomic History    Marital status: Not on file     Spouse name: Not on file    Number of children: Not on file    Years of education: Not on file    Highest education level: Not on file   Occupational History    Not on file   Tobacco Use    Smoking status: Not on file   Substance and Sexual Activity    Alcohol use: Not on file    Drug use: Not on file    Sexual activity: Not on file   Other Topics Concern    Not on file   Social History Narrative    Not on file     Social Determinants of Health     Financial Resource Strain:     Difficulty of Paying Living Expenses:    Food Insecurity:     Worried About Running Out of Food in the Last Year:     [address] in the Last Year:    Transportation Needs:     Lack of Transportation (Medical):      Lack of Transportation (Non-Medical):    Physical Activity:     Days of Exercise per Week:     Minutes of Exercise per Session:    Stress:     Feeling of Stress :    Social Connections:     Frequency of Communication with Friends and Family:     Frequency of Social Gatherings with Friends and Family:     Attends Yazidi Services:     Active Member of Clubs or Organizations:     Attends Club or Organization Meetings:     Marital Status:    Intimate Partner Violence:     Fear of Current or Ex-Partner:     Emotionally Abused:     Physically Abused:     Sexually Abused: ALLERGIES: Patient has no known allergies. Review of Systems   Unable to perform ROS: Acuity of condition       Vitals:    09/13/21 0725 09/13/21 0730 09/13/21 0735 09/13/21 0740   BP: 105/68 114/79 110/78 108/77   Pulse: (!) 58 62 65 65   Resp: 16 18 18 15   Temp:       SpO2: 95% 99% 97% 99%            Physical Exam  Vitals and nursing note reviewed. Constitutional:       General: He is in acute distress. Appearance: He is ill-appearing and toxic-appearing. Comments: Patient extremely cachectic, malnourished, contracted throughout. Patient cool to touch temperature 89.9 °F. HENT:      Head: Normocephalic and atraumatic. Mouth/Throat:      Mouth: Mucous membranes are dry. Eyes:      General: Scleral icterus present. Conjunctiva/sclera: Conjunctivae normal.   Cardiovascular:      Rate and Rhythm: Normal rate and regular rhythm. Abdominal:      General: Abdomen is flat. Tenderness: There is no abdominal tenderness. There is no rebound. Musculoskeletal:      Cervical back: Rigidity present. Skin:     General: Skin is dry. Comments: Cool to touch   Neurological:      Mental Status: He is unresponsive. GCS: GCS eye subscore is 2. GCS verbal subscore is 3. GCS motor subscore is 4. MDM  Number of Diagnoses or Management Options  Diagnosis management comments: A/P: Hypoglycemia, failure to thrive. 78-year-old male present emergency department blood sugar in the 20s patient received 1 amp of D50 with improvement to 40s repeat amps of D50 with minimal improvement.   Will obtain labs, aggressive fluid resuscitation patient is DNR we will try to contact family. Amount and/or Complexity of Data Reviewed  Clinical lab tests: ordered and reviewed  Tests in the radiology section of CPT®: reviewed and ordered           Procedures      Perfect Serve Consult for Admission  7:58 AM    ED Room Number: ER07/07  Patient Name and age:  Campbell Ames 67 y.o.  male  Working Diagnosis:   1. Transaminitis    2. Failure to thrive in adult    3. Hypothermia, initial encounter    4. Hypoglycemia        COVID-19 Suspicion:  no  Sepsis present:  no  Reassessment needed: yes  Code Status:  Do Not Resuscitate  Readmission: no  Isolation Requirements:  no  Recommended Level of Care:  telemetry  Department:[facility] Adult ED - 21   Other: Total critical care time spent exclusive of procedures:  55 min.

## 2021-09-13 NOTE — ED NOTES
Bedside and Verbal shift change report given to Ananth Chahal RN (oncoming nurse) by Beatriz Clemens RN (offgoing nurse). Report included the following information SBAR, ED Summary, MAR and Recent Results.

## 2021-09-13 NOTE — ED NOTES
Anesthesia called at this time to place [facility], anesthesia unavailable to come to the ER at this time. Dr. Peri Armas paged at this time.

## 2021-09-13 NOTE — CONSULTS
Palliative Medicine Consult  [place]: [phone]    Patient Name: Kori Kaba  YOB: 1945    Date of Initial Consult: 9/13/21  Reason for Consult: Care decisions  Requesting Provider: Dr. Krystal Ramirez  Primary Care Physician: Renee Jones MD     SUMMARY:   Kori Kaba is a 67 y.o. male with a past history of hypertension, diabetes mellitus, dysphagia, aspiration, failure to thrive, right sided weakness, right eye blindness, and dementia, who was admitted on 9/13/2021from home with a diagnosis of hypoglycemia, hypothermia, hypotension, UTI, ANILA, and abnormal LFTs. His daughter reported that he had recently been diagnosed with an UTI and started antibiotics, which caused diarrhea. He presented with complaints of altered mental status. He was found to be hypoglycemic, hypotensive, with ANILA, and abnormal LFTs. He was also found to have a complete opacification on the left per CXR. He was placed under a warming blanket and started on D5W IV fluids. Current medical issues leading to Palliative Medicine involvement include: multiple acute medical issues, failure to thrive. Social: He has a daughter, Regina Cortez, and a son, Virgen Hill. PALLIATIVE DIAGNOSES:   1. Palliative care encounter  2. Goals of care  3. DNR discussion  4. Frequent hospitalizations  5. Altered mental status  6. Hypoglycemia  7. UTI  8. Dehydration, ANILA  9. Poor PO intake prior to admit  10. Diarrhea  11. Frailty  12. Failure to thrive       PLAN:   1. Prior to seeing the patient, the medical record was reviewed. 2. Patient seen at the bedside. He was poorly responsive and did not engage in conversation with me. His O2 sats were 98% on room air and he was under the Entelo. 3. Call placed to rene Rufinovipul Lori and palliative medicine services introduced. She later added the patient's niece to the call as well. 4. We talked at length about his frequent hospitalizations at [facility].  Regina Cortez reports that Entelo up on him\" and had recommended hospice care during his last admission. We talked about the burden of frequent hospitalizations on the patient and that he does not seem to be making any real gains in his recovery. Ariel Cantu reports that every time he comes home from the hospital, he is sick again within a week, usually with an UTI. The antibiotics from the UTIs always given him diarrhea and it has become a repetitive cycle. 5. We talked about the feeding difficulties and his poor PO intake prior to admit. Ariel Cantu tried to get him to take some Pedialyte and Ensure, but he was not able to get anything down. She was very worried about him, which is why she called EMS. She also talked about his problems with aspiration. I explained that with his history of dementia, a feeding tube would not be recommended for him. Both she and his niece had several questions about this and I explained that dysphagia is a sign of progression of the dementia and that placing a feeding tube would only serve to prolong this disease process. I also explained that many patients still have trouble managing their secretions and some also have difficulty with aspirating the tube feedings. 6. We also talked about his renal failure and that nephrology has been consulted, but that he would be a poor dialysis candidate. Ariel Cantu was in agreement with this and stated that she did not think his body could handle dialysis or any other kind of aggressive care, which I agreed with. 7. We talked about his code status as well. Ariel Cantu had already made him a do not intubate status, but we talked further about the additional resuscitation measures. Due to his frailty and multiple medical problems, Ariel Cantu decided to change him to a DO NOT RESUSCITATE and DO NOT INTUBATE status after our discussion. Orders were placed to reflect this. 8. We also talked about hospice care. I explained that the patient would be appropriate for hospice care at this time.  Ariel Cantu and his niece asked several questions about hospice and I explained hospice philosophy to them. They are open to possibly transitioning him to hospice, but want to see how he responds to the current, gentle care he is receiving with IV fluids and the warming blanket. They have concerns about not being able to bring him back to the hospital if he is on hospice care, but they also understand that coming back and forth to the hospital is not really helping him recover in a meaningful way. 9. Palliative medicine contact information given to them and we made a plan to follow up again in the next 1-2 days for continued discussions about goals of care and next steps. 10. Initial consult note routed to primary continuity provider and/or primary health care team members  11. Communicated plan of care with: Palliative IDT, [facility] Team     GOALS OF CARE / TREATMENT PREFERENCES:     GOALS OF CARE:  Patient/Health Care Proxy Stated Goals: Prolong life    TREATMENT PREFERENCES:   Code Status: DNR    Advance Care Planning:  [x] [facility] Interdisciplinary Team has updated the ACP Navigator with Health Care Decision Maker and Patient Capacity      Primary Decision Maker: Molly Reyez" - Daughter - [phone]    Primary Decision Maker: Gema Henriquez - Son  No flowsheet data found. Medical Interventions: Limited additional interventions     Other Instructions: Other:    As far as possible, the palliative care team has discussed with patient / health care proxy about goals of care / treatment preferences for patient.      HISTORY:     History obtained from: chart    CHIEF COMPLAINT: Altered mental status    HPI/SUBJECTIVE:    The patient is:   [] Verbal and participatory  [x] Non-participatory due to: altered mental status, clinical condition    Clinical Pain Assessment (nonverbal scale for severity on nonverbal patients):   Clinical Pain Assessment  Severity: 0     Activity (Movement): Lying quietly, normal position    Duration: for how long has pt been experiencing pain (e.g., 2 days, 1 month, years)  Frequency: how often pain is an issue (e.g., several times per day, once every few days, constant)     FUNCTIONAL ASSESSMENT:     Palliative Performance Scale (PPS):  PPS: 30       PSYCHOSOCIAL/SPIRITUAL SCREENING:     Palliative IDT has assessed this patient for cultural preferences / practices and a referral made as appropriate to needs (Cultural Services, Patient Advocacy, Ethics, etc.)    Any spiritual / Roman Catholic concerns:  [] Yes /  [x] No    Caregiver Burnout:  [] Yes /  [x] No /  [] No Caregiver Present      Anticipatory grief assessment:   [x] Normal  / [] Maladaptive       ESAS Anxiety:      ESAS Depression:          REVIEW OF SYSTEMS:     Positive and pertinent negative findings in ROS are noted above in HPI. The following systems were [] reviewed / [x] unable to be reviewed as noted in HPI  Other findings are noted below. Systems: constitutional, ears/nose/mouth/throat, respiratory, gastrointestinal, genitourinary, musculoskeletal, integumentary, neurologic, psychiatric, endocrine. Positive findings noted below. Modified ESAS Completed by: provider   Fatigue: 10 Drowsiness: 10     Pain: 0           Dyspnea: 0           Stool Occurrence(s): 1        PHYSICAL EXAM:     From RN flowsheet:  Wt Readings from Last 3 Encounters:   No data found for Wt     Blood pressure 131/64, pulse 76, temperature 97.9 °F (36.6 °C), resp. rate 26, SpO2 92 %.     Pain Scale 1: Visual  Pain Intensity 1: 0                 Last bowel movement, if known:     Constitutional: sleeping quietly, frail, elderly, poorly responsive  Eyes: pupils equal, anicteric  ENMT: no nasal discharge, dry mucous membranes  Cardiovascular: regular rhythm  Respiratory: breathing not labored, symmetric  Musculoskeletal: no deformity, no tenderness to palpation  Skin: warm, dry  Neurologic: not following commands, moving all extremities  Psychiatric: unable to evaluate     HISTORY:     Active Problems:    Hypothermia (9/13/2021)      Hypoglycemia (9/13/2021)      Elevated liver enzymes (9/13/2021)      Acute metabolic encephalopathy (2/17/5748)      No past medical history on file. No past surgical history on file. No family history on file. History reviewed, no pertinent family history. Social History     Tobacco Use    Smoking status: Not on file   Substance Use Topics    Alcohol use: Not on file     No Known Allergies   Current Facility-Administered Medications   Medication Dose Route Frequency    dextrose 5% infusion  125 mL/hr IntraVENous CONTINUOUS    glucose chewable tablet 16 g  4 Tablet Oral PRN    dextrose (D50W) injection syrg 12.5-25 g  12.5-25 g IntraVENous PRN    glucagon (GLUCAGEN) injection 1 mg  1 mg IntraMUSCular PRN    sodium chloride (NS) flush 5-40 mL  5-40 mL IntraVENous Q8H    sodium chloride (NS) flush 5-40 mL  5-40 mL IntraVENous PRN    acetaminophen (TYLENOL) tablet 650 mg  650 mg Oral Q6H PRN    Or    acetaminophen (TYLENOL) suppository 650 mg  650 mg Rectal Q6H PRN    polyethylene glycol (MIRALAX) packet 17 g  17 g Oral DAILY PRN    ondansetron (ZOFRAN ODT) tablet 4 mg  4 mg Oral Q8H PRN    Or    ondansetron (ZOFRAN) injection 4 mg  4 mg IntraVENous Q6H PRN    cefTRIAXone (ROCEPHIN) 1 g in 0.9% sodium chloride (MBP/ADV) 50 mL MBP  1 g IntraVENous Q24H     Current Outpatient Medications   Medication Sig    amLODIPine (NORVASC) 10 mg tablet Take 10 mg by mouth daily.  aspirin 81 mg chewable tablet Take 81 mg by mouth daily.  atorvastatin (LIPITOR) 80 mg tablet Take 80 mg by mouth daily.  cetirizine (ZYRTEC) 5 mg tablet Take 5 mg by mouth daily as needed for Allergies.  cyanocobalamin 1,000 mcg tablet Take 1,000 mcg by mouth daily.  famotidine (PEPCID) 10 mg tablet Take 10 mg by mouth nightly.  ferrous sulfate 325 mg (65 mg iron) tablet Take 325 mg by mouth Daily (before breakfast).     furosemide (Lasix) 20 mg tablet Take 20 mg by mouth every other day.  guaiFENesin (ORGANIDIN) 200 mg tablet Take 200 mg by mouth every four (4) hours as needed for Congestion.  hydrALAZINE (APRESOLINE) 25 mg tablet Take 25 mg by mouth three (3) times daily.  isosorbide dinitrate (ISORDIL) 20 mg tablet Take 20 mg by mouth three (3) times daily.  loratadine (CLARITIN) 10 mg tablet Take 10 mg by mouth daily as needed for Allergies.  metoprolol tartrate (LOPRESSOR) 50 mg tablet Take 50 mg by mouth two (2) times a day.  tamsulosin (Flomax) 0.4 mg capsule Take 0.4 mg by mouth nightly. LAB AND IMAGING FINDINGS:     Lab Results   Component Value Date/Time    WBC 5.4 09/13/2021 05:59 AM    HGB 8.6 (L) 09/13/2021 05:59 AM    PLATELET 66 (L) 29/96/7849 05:59 AM     Lab Results   Component Value Date/Time    Sodium 153 (H) 09/13/2021 05:59 AM    Potassium 4.8 09/13/2021 05:59 AM    Chloride 124 (H) 09/13/2021 05:59 AM    CO2 20 (L) 09/13/2021 05:59 AM     (H) 09/13/2021 05:59 AM    Creatinine 4.01 (H) 09/13/2021 05:59 AM    Calcium 6.9 (L) 09/13/2021 05:59 AM    Magnesium 2.4 09/13/2021 05:59 AM    Phosphorus 8.5 (H) 09/13/2021 05:59 AM      Lab Results   Component Value Date/Time    Alk. phosphatase 1,303 (H) 09/13/2021 09:42 AM    Protein, total 6.0 (L) 09/13/2021 09:42 AM    Albumin 2.0 (L) 09/13/2021 09:42 AM    Globulin 4.0 09/13/2021 09:42 AM     No results found for: INR, PTMR, PTP, PT1, PT2, APTT, INREXT, INREXT   No results found for: IRON, FE, TIBC, IBCT, PSAT, FERR   No results found for: PH, PCO2, PO2  No components found for: [place]   Lab Results   Component Value Date/Time     (H) 09/13/2021 05:59 AM                Total time: 70 min  Counseling / coordination time, spent as noted above: 45 min  > 50% counseling / coordination?: yes    Prolonged service was provided for  []30 min   []75 min in face to face time in the presence of the patient, spent as noted above.   Time Start:   Time End: Note: this can only be billed with 39961 (initial) or 18648 (follow up). If multiple start / stop times, list each separately.

## 2021-09-13 NOTE — ED NOTES
Central line and robertson catheter orders discontinued at this time. PICC team will be placing a PICC, therefore, no longer need a central line. Patient arrived with condom catheter and draining urine, therefore robertson catheter no longer needed. 157.48

## 2021-09-13 NOTE — ED NOTES
This RN called and talked to Mrs Talha Mcnamara and received telephone consent for PICC line insertion. Second Nurse verification by Willy Peña RN.

## 2021-09-13 NOTE — ED NOTES
Dr. Hallie Flores called back at this time, notified him about patient's most recent blood sugar and that they where doing much better. Also notified him that rectal temperature is now within normal limits at 97.9 F, was told to decrease the setting on Annel Hugger from 43 degrees celsius to 38 degrees Celsius. Temperature decreased at this time. Asked provider if he still wanted patient to have NG tube since we where able to place central line, states to hold off on the NG tube for now. Lastly, notified provider if he wanted to keep the condom catheter or if he wanted to switch to robertson, provider states he would like robertson placed for better measurement. Will re-place robertson catheter order in and will place catheter in patient.

## 2021-09-13 NOTE — ED NOTES
Patient's blood sugar is 44, Dr. Arias Flores placing central line in now. Will push D50 as soon as line is in place.

## 2021-09-13 NOTE — PROGRESS NOTES
Received call from RN that blood glucose dropping,received IM glucagon,pt lost IV access. Anesthesia and PICC were consulted earlier,apparently line not placed yet. Beverly Youngblood said to be busy and vascular access team for PICC line,reportedly delayed unable to reach dtr for consent. I spoke with ICU team to help,they are tied up right now. I spoke with ED MD Dr Taisha Sorensen who kindly agreed to help. Dr Taisha Sorensen preparing to place central line. Dtr gave telephone consent. Per Rn,patient had brief seizure like activity. Patient currently mental status no different from this morning,no evidence of distress.     Will need NGT for feeding as well,ordered

## 2021-09-13 NOTE — CONSULTS
[facility]   [address] [facility], [address] [place] [address]  Phone: [phone]   [phone] NOTE     Patient: Karen Rangel MRN: [id number]  PCP: Jose Trimble MD   :     1945  Age:   67 y.o. Sex:  male      Referring physician: Gail Nazario MD  Reason for consultation: 67 y.o. male with Hypothermia [T68. XXXA]  Hypoglycemia [E16.2]  Elevated liver enzymes [I77.1]  Acute metabolic encephalopathy [L86.77] complicated by ARF  Admission Date: 2021  5:49 AM  LOS: 0 days      ASSESSMENT and PLAN :   ANILA :  - Baseline creatinine unknown was on Bactrim prior to admission.   -Looks terminally ill and very dehydrated. -Agree with hypotonic IV fluids as ordered  - Renal ultrasound to rule out obstruction  -Not a candidate for dialysis by any means    Hyponatremia:  -Secondary to dehydration  -Agree with hypotonic fluids as are ordered    Opacification of left lung  -Per primary team    Hx of CVA  Encephalopathy  Dementia  -MX per primary team    Hypoglycemia  Hypothermia  -Likely 2/2 infection    Recent UTI  -Antibiotics per primary team    Care Plan discussed with: Dr. Cookie Rivers     Thank you for consulting [facility] in the care of your patient. Subjective:   HPI: Karen Rangel is a 67 y.o.  male who has been admitted to the hospital for severe hypoglycemia with a blood glucose less than 3 0. In the ER he was noted to be hyponatremic and in acute renal failure with a creatinine greater than 4 and a BUN greater than 122. Nephrology has been asked to evaluate and manage ANILA and electrolyte derangements. Mr. Ally Ramirez is completely obtunded and is unable to provide any history. Henceforth the history is from a available medical records. Reportedly patient was hospitalized at [facility]. He was recently diagnosed with UTI and was on Bactrim for 4 days prior to this admission.     Past Medical Hx:    Unable to obtain a past medical history    Past Surgical Hx:   Unable to obtain past surgical history    No Known Allergies    Social Hx:       No family history on file. Review of Systems:  Unable to perform a review of systems due to obtunded status. Objective:    Vitals:    Vitals:    09/13/21 1045 09/13/21 1130 09/13/21 1200 09/13/21 1300   BP: 121/63 (!) 121/53 111/65 117/81   Pulse: 61 82 69 74   Resp: 15 21 13 14   Temp:   (!) 95.5 °F (35.3 °C)    SpO2: 93% 95% 93% 97%     I&O's:  No intake/output data recorded.   Visit Vitals  /81   Pulse 74   Temp (!) 95.5 °F (35.3 °C) Comment: on Annel Hugger   Resp 14   SpO2 97%       Physical Exam:  General: Cachectic and terminal looking  HEENT: PERRL++Pallor , No Icterus  Neck: Supple,no mass palpable  Lungs : Diminished bilaterally  CVS: RRR, S1 S2 normal, No murmur   Abdomen: Soft, NT, BS +  Extremities: no Edema  Neurologic: Obtunded, bilateral contractures  Psych: Unable to assess    Laboratory Results:    Recent Labs     09/13/21  0942 09/13/21  0559   NA  --  153*   K  --  4.8   CL  --  124*   CO2  --  20*   GLU  --  13*   BUN  --  122*   CREA  --  4.01*   CA  --  6.9*   MG  --  2.4   PHOS  --  8.5*   ALB 2.0* 1.9*   * 755*     Recent Labs     09/13/21  0559   WBC 5.4   HGB 8.6*   HCT 26.5*   PLT 66*     No results found for: SDES  No results found for: CULT  Recent Results (from the past 24 hour(s))   GLUCOSE, POC    Collection Time: 09/13/21  5:41 AM   Result Value Ref Range    Glucose (POC) 17 (LL) 65 - 117 mg/dL    Performed by Delta Ortiz    CBC WITH AUTOMATED DIFF    Collection Time: 09/13/21  5:59 AM   Result Value Ref Range    WBC 5.4 4.1 - 11.1 K/uL    RBC 3.06 (L) 4.10 - 5.70 M/uL    HGB 8.6 (L) 12.1 - 17.0 g/dL    HCT 26.5 (L) 36.6 - 50.3 %    MCV 86.6 80.0 - 99.0 FL    MCH 28.1 26.0 - 34.0 PG    MCHC 32.5 30.0 - 36.5 g/dL    RDW 15.3 (H) 11.5 - 14.5 %    PLATELET 66 (L) 845 - 400 K/uL    NRBC 1.1 (H) 0  WBC    ABSOLUTE NRBC 0.06 (H) 0.00 - 0.01 K/uL    NEUTROPHILS 89 (H) 32 - 75 %    BAND NEUTROPHILS 2 0 - 6 %    LYMPHOCYTES 5 (L) 12 - 49 %    MONOCYTES 2 (L) 5 - 13 %    EOSINOPHILS 2 0 - 7 %    BASOPHILS 0 0 - 1 %    IMMATURE GRANULOCYTES 0 %    ABS. NEUTROPHILS 4.9 1.8 - 8.0 K/UL    ABS. LYMPHOCYTES 0.3 (L) 0.8 - 3.5 K/UL    ABS. MONOCYTES 0.1 0.0 - 1.0 K/UL    ABS. EOSINOPHILS 0.1 0.0 - 0.4 K/UL    ABS. BASOPHILS 0.0 0.0 - 0.1 K/UL    ABS. IMM. GRANS. 0.0 K/UL    DF MANUAL      PLATELET COMMENTS Large Platelets      RBC COMMENTS ANISOCYTOSIS  1+        RBC COMMENTS EMERITA CELLS  PRESENT       METABOLIC PANEL, COMPREHENSIVE    Collection Time: 09/13/21  5:59 AM   Result Value Ref Range    Sodium 153 (H) 136 - 145 mmol/L    Potassium 4.8 3.5 - 5.1 mmol/L    Chloride 124 (H) 97 - 108 mmol/L    CO2 20 (L) 21 - 32 mmol/L    Anion gap 9 5 - 15 mmol/L    Glucose 13 (LL) 65 - 100 mg/dL     (H) 6 - 20 MG/DL    Creatinine 4.01 (H) 0.70 - 1.30 MG/DL    BUN/Creatinine ratio 30 (H) 12 - 20      GFR est AA 18 (L) >60 ml/min/1.73m2    GFR est non-AA 15 (L) >60 ml/min/1.73m2    Calcium 6.9 (L) 8.5 - 10.1 MG/DL    Bilirubin, total 0.5 0.2 - 1.0 MG/DL    ALT (SGPT) 755 (H) 12 - 78 U/L    AST (SGOT) >2,000 (H) 15 - 37 U/L    Alk.  phosphatase 1,186 (H) 45 - 117 U/L    Protein, total 6.3 (L) 6.4 - 8.2 g/dL    Albumin 1.9 (L) 3.5 - 5.0 g/dL    Globulin 4.4 (H) 2.0 - 4.0 g/dL    A-G Ratio 0.4 (L) 1.1 - 2.2     TROPONIN I    Collection Time: 09/13/21  5:59 AM   Result Value Ref Range    Troponin-I, Qt. <0.05 <0.05 ng/mL   MAGNESIUM    Collection Time: 09/13/21  5:59 AM   Result Value Ref Range    Magnesium 2.4 1.6 - 2.4 mg/dL   PHOSPHORUS    Collection Time: 09/13/21  5:59 AM   Result Value Ref Range    Phosphorus 8.5 (H) 2.6 - 4.7 MG/DL   CK    Collection Time: 09/13/21  5:59 AM   Result Value Ref Range     (H) 39 - 308 U/L   LD    Collection Time: 09/13/21  5:59 AM   Result Value Ref Range    LD 1,563 (H) 85 - 241 U/L   TSH 3RD GENERATION    Collection Time: 09/13/21  5:59 AM   Result Value Ref Range    TSH 11.00 (H) 0.36 - 3.74 uIU/mL   POC LACTIC ACID    Collection Time: 09/13/21  6:03 AM   Result Value Ref Range    Lactic Acid (POC) 0.57 0.40 - 2.00 mmol/L   GLUCOSE, POC    Collection Time: 09/13/21  6:06 AM   Result Value Ref Range    Glucose (POC) 25 (LL) 65 - 117 mg/dL    Performed by Melita Stuart    GLUCOSE, POC    Collection Time: 09/13/21  6:24 AM   Result Value Ref Range    Glucose (POC) 45 (LL) 65 - 117 mg/dL    Performed by Mary Ellen Avina    GLUCOSE, POC    Collection Time: 09/13/21  7:10 AM   Result Value Ref Range    Glucose (POC) 153 (H) 65 - 117 mg/dL    Performed by Lali Barrera    GLUCOSE, POC    Collection Time: 09/13/21  9:29 AM   Result Value Ref Range    Glucose (POC) 99 65 - 117 mg/dL    Performed by Chris Kent    EKG, 12 LEAD, INITIAL    Collection Time: 09/13/21  9:29 AM   Result Value Ref Range    Ventricular Rate 67 BPM    Atrial Rate 67 BPM    P-R Interval 184 ms    QRS Duration 78 ms    Q-T Interval 450 ms    QTC Calculation (Bezet) 475 ms    Calculated P Axis 60 degrees    Calculated R Axis 81 degrees    Calculated T Axis 93 degrees    Diagnosis       Normal sinus rhythm  Nonspecific T wave abnormality  Abnormal ECG  No previous ECGs available  Confirmed by Lazaro Poe MD. ([id number]) on 9/13/2021 11:32:40 AM     CORTISOL    Collection Time: 09/13/21  9:42 AM   Result Value Ref Range    Cortisol, random 35.3 ug/dL   HEPATIC FUNCTION PANEL    Collection Time: 09/13/21  9:42 AM   Result Value Ref Range    Protein, total 6.0 (L) 6.4 - 8.2 g/dL    Albumin 2.0 (L) 3.5 - 5.0 g/dL    Globulin 4.0 2.0 - 4.0 g/dL    A-G Ratio 0.5 (L) 1.1 - 2.2      Bilirubin, total 0.4 0.2 - 1.0 MG/DL    Bilirubin, direct 0.2 0.0 - 0.2 MG/DL    Alk.  phosphatase 1,303 (H) 45 - 117 U/L    AST (SGOT) >2,000 (H) 15 - 37 U/L    ALT (SGPT) 928 (H) 12 - 78 U/L   COVID-19 RAPID TEST    Collection Time: 09/13/21  9:43 AM   Result Value Ref Range    Specimen source Nasopharyngeal      COVID-19 rapid test Not detected NOTD     GLUCOSE, POC    Collection Time: 09/13/21 10:50 AM   Result Value Ref Range    Glucose (POC) 81 65 - 117 mg/dL    Performed by Neptali Gale    URINALYSIS W/MICROSCOPIC    Collection Time: 09/13/21 11:37 AM   Result Value Ref Range    Color YELLOW/STRAW      Appearance CLOUDY (A) CLEAR      Specific gravity 1.015 1.003 - 1.030      pH (UA) 7.0 5.0 - 8.0      Protein 300 (A) NEG mg/dL    Glucose Negative NEG mg/dL    Ketone Negative NEG mg/dL    Bilirubin Negative NEG      Blood SMALL (A) NEG      Urobilinogen 0.2 0.2 - 1.0 EU/dL    Nitrites Positive (A) NEG      Leukocyte Esterase MODERATE (A) NEG      WBC 10-20 0 - 4 /hpf    RBC 0-5 0 - 5 /hpf    Epithelial cells FEW FEW /lpf    Bacteria 4+ (A) NEG /hpf   URINE CULTURE HOLD SAMPLE    Collection Time: 09/13/21 11:37 AM    Specimen: Serum; Urine   Result Value Ref Range    Urine culture hold        Urine on hold in Microbiology dept for 2 days. If unpreserved urine is submitted, it cannot be used for addtional testing after 24 hours, recollection will be required.    GLUCOSE, POC    Collection Time: 09/13/21 12:06 PM   Result Value Ref Range    Glucose (POC) 65 65 - 117 mg/dL    Performed by Mary Ann Ingram (CON)    GLUCOSE, POC    Collection Time: 09/13/21 12:12 PM   Result Value Ref Range    Glucose (POC) 68 65 - 117 mg/dL    Performed by Mary Ann Ingram (CON)    GLUCOSE, POC    Collection Time: 09/13/21 12:38 PM   Result Value Ref Range    Glucose (POC) 76 65 - 117 mg/dL    Performed by Mary Ann Ingram (CON)    GLUCOSE, POC    Collection Time: 09/13/21  1:57 PM   Result Value Ref Range    Glucose (POC) 54 (L) 65 - 117 mg/dL    Performed by Mary Ann Ingram (CON)    GLUCOSE, POC    Collection Time: 09/13/21  2:00 PM   Result Value Ref Range    Glucose (POC) 56 (L) 65 - 117 mg/dL    Performed by Mary Ann Ingram (CON)          Urine dipstick:   Lab Results   Component Value Date/Time    Color YELLOW/STRAW 09/13/2021 11:37 AM    Appearance CLOUDY (A) 09/13/2021 11:37 AM    Specific gravity 1.015 09/13/2021 11:37 AM    pH (UA) 7.0 09/13/2021 11:37 AM    Protein 300 (A) 09/13/2021 11:37 AM    Glucose Negative 09/13/2021 11:37 AM    Ketone Negative 09/13/2021 11:37 AM    Bilirubin Negative 09/13/2021 11:37 AM    Urobilinogen 0.2 09/13/2021 11:37 AM    Nitrites Positive (A) 09/13/2021 11:37 AM    Leukocyte Esterase MODERATE (A) 09/13/2021 11:37 AM    Epithelial cells FEW 09/13/2021 11:37 AM    Bacteria 4+ (A) 09/13/2021 11:37 AM    WBC 10-20 09/13/2021 11:37 AM    RBC 0-5 09/13/2021 11:37 AM       I have reviewed the following: All pertinent labs, microbiology data, radiology imaging for my assessment     Medications list Personally Reviewed   [x]      Yes     []               No       Medications:  Prior to Admission medications    Medication Sig Start Date End Date Taking? Authorizing Provider   amLODIPine (NORVASC) 10 mg tablet Take 10 mg by mouth daily. Yes Provider, Historical   aspirin 81 mg chewable tablet Take 81 mg by mouth daily. Yes Provider, Historical   atorvastatin (LIPITOR) 80 mg tablet Take 80 mg by mouth daily. Yes Provider, Historical   cetirizine (ZYRTEC) 5 mg tablet Take 5 mg by mouth daily as needed for Allergies. Yes Provider, Historical   cyanocobalamin 1,000 mcg tablet Take 1,000 mcg by mouth daily. Yes Provider, Historical   famotidine (PEPCID) 10 mg tablet Take 10 mg by mouth nightly. Yes Provider, Historical   ferrous sulfate 325 mg (65 mg iron) tablet Take 325 mg by mouth Daily (before breakfast). Yes Provider, Historical   furosemide (Lasix) 20 mg tablet Take 20 mg by mouth every other day. Yes Provider, Historical   guaiFENesin (ORGANIDIN) 200 mg tablet Take 200 mg by mouth every four (4) hours as needed for Congestion. Yes Provider, Historical   hydrALAZINE (APRESOLINE) 25 mg tablet Take 25 mg by mouth three (3) times daily.    Yes Provider, Historical isosorbide dinitrate (ISORDIL) 20 mg tablet Take 20 mg by mouth three (3) times daily. Yes Provider, Historical   loratadine (CLARITIN) 10 mg tablet Take 10 mg by mouth daily as needed for Allergies. Yes Provider, Historical   metoprolol tartrate (LOPRESSOR) 50 mg tablet Take 50 mg by mouth two (2) times a day. Yes Provider, Historical   tamsulosin (Flomax) 0.4 mg capsule Take 0.4 mg by mouth nightly. Yes Provider, Historical        Thank you for allowing us to participate in the care of this patient. We will follow patient. Please dont hesitate to call with any questions    Jhonny Mejía MD  [facility] Nephrology [facility]   [address] [address]  Phone - [phone]   Fax - [phone]  [email or web address]

## 2021-09-13 NOTE — ED NOTES
Paged Dr. Omer Mar once more, patient had a witnesed seizure at 21 [phone] that lasted 1 minute. Dr. Omer Mar came to bedside and notified. Patient's blood sugar in the 50s. Gave another dose of IM glucagon. Dr. Lino Cleveland, ER provider getting ready to place central line in. Charge nurse calling patient's daughter for consent.

## 2021-09-13 NOTE — ED TRIAGE NOTES
Pt arrives from home with C of failure to thrive and hypoglycemia, bg was 24 for EMS    On arrival bg is under 17, pt is contracted and minimally responsive

## 2021-09-13 NOTE — ED NOTES
Central Line    Date/Time: 9/13/2021 3:44 PM  Performed by: Carrie Lane MD  Authorized by: Carrie Lane MD     Consent:     Consent obtained:  Verbal and emergent situation    Consent given by:  Healthcare agent (Patient's daughter)  Pre-procedure details:     Hand hygiene: Hand hygiene performed prior to insertion      Skin preparation:  2% chlorhexidine  Anesthesia (see MAR for exact dosages): Anesthesia method:  Local infiltration    Local anesthetic:  Lidocaine 1% w/o epi  Procedure details:     Location:  R internal jugular    Patient position:  Trendelenburg    Procedural supplies: Quad lumen. Catheter size:  7.5 Fr    Landmarks identified: yes      Ultrasound guidance: yes      Sterile ultrasound techniques: Sterile gel and sterile probe covers were used      Number of attempts:  1    Successful placement: yes    Post-procedure details:     Post-procedure:  Dressing applied and line sutured    Assessment:  Blood return through all ports, free fluid flow, no pneumothorax on x-ray and placement verified by x-ray    Patient tolerance of procedure: Tolerated well, no immediate complications      Called to the room by nursing staff for the patient having a seizure. Patient was noted to be hypoglycemic. IV access is extremely limited. A PICC line has not yet been placed. Verbal consent was provided by the patient's daughter. Successful right IJ was placed by myself. Patient was given D50. Hospital medicine updated.     Total critical care time spent exclusive of procedures:  31 minutes

## 2021-09-13 NOTE — ED NOTES
Called Dr. Nick Booth and notified him of patient's rectal temperature of 93.2 F, advised that patient is on highest setting on bruno hugger at this time. Was advised to keep patient on [employer]. Also notified provider that patient's EJ line has infiltrated and patient is a difficult stick, received verbal order with readback to place order for central line at this time and call anesthesia to perform procedure. Also received verbal order with readback for robertson catheter at this time.

## 2021-09-13 NOTE — PROGRESS NOTES
Spiritual Care Assessment/Progress Note  [facility]      NAME: Barbara Herrera      MRN: [id number]  AGE: 67 y.o. SEX: male  Spiritism Affiliation: No preference   Language: English     9/13/2021     Total Time (in minutes): 15     Spiritual Assessment begun in [address] through conversation with:         []Patient        [] Family    [] Friend(s)        Reason for Consult: Palliative Care, Initial/Spiritual Assessment     Spiritual beliefs: (Please include comment if needed)     [] Identifies with a wes tradition:         [] Supported by a wes community:            [] Claims no spiritual orientation:           [] Seeking spiritual identity:                [] Adheres to an individual form of spirituality:           [x] Not able to assess:                           Identified resources for coping:      [] Prayer                               [] Music                  [] Guided Imagery     [] Family/friends                 [] Pet visits     [] Devotional reading                         [x] Unknown     [] Other:                                             Interventions offered during this visit: (See comments for more details)    Patient Interventions: Initial visit           Plan of Care:     [] Support spiritual and/or cultural needs    [] Support AMD and/or advance care planning process      [] Support grieving process   [] Coordinate Rites and/or Rituals    [] Coordination with community clergy   [] No spiritual needs identified at this time   [] Detailed Plan of Care below (See Comments)  [] Make referral to Music Therapy  [] Make referral to Pet Therapy     [] Make referral to Addiction services  [] Make referral to [facility]  [] Make referral to Spiritual Care Partner  [] No future visits requested        [x] Follow up upon further referrals     Attempted visit for palliative initial spiritual assessment. Unable to visit patient at this time. Pt was sleeping and did not awake.  No family present. Pt's chart was consulted.   Chaplain Rojas MDiv, MS, [address]  [address] [place] [address] [phone])

## 2021-09-13 NOTE — ACP (ADVANCE CARE PLANNING)
Advance Care Planning                          ACP discussion held over the phone with patient's daughter Torres Glover and his niece. We discussed his current medical condition and plan of care. They do not want to pursue aggressive care for him. They are in agreement to change the resuscitation status to DO NOT RESUSCITATE and DO NOT INTUBATE. They do not think that he would want to pursue surgery or dialysis if these treatments were recommended. We discussed the possibility of transitioning to hospice care, but they are not ready for this. They would like to see how he responds to the current care, including the Annel hugger and IV fluids. Plan made to have ongoing discussions about goals of care.         Daniel Kent, SHANNEN-C

## 2021-09-13 NOTE — ED NOTES
Patient's bladder temperature is 99 degrees celsius at this time, Annel Hugger turned off for the time being.

## 2021-09-13 NOTE — ED NOTES
Dr. Antonette Minaya paged back and states he is ok with PICC line insertion even though creatinine is elevated. PICC team notified.
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
## 2021-09-13 NOTE — ED NOTES
Spoke to Dr. Hallie Flores and notified him that patients blood sugar keeps dropping and we keep chasing with glucagon IM several times. PICC team called again to see ETA, but no answer. Left voicemail.

## 2021-09-14 NOTE — PROGRESS NOTES
[facility]   [address] [facility], [address] [place] [address], [facility]  Phone: [phone]   [phone]       Nephrology Progress Note  Fay Chi     1945     [id number]  Date of Admission : 9/13/2021 09/14/21    CC: Follow up for ARF       Assessment and Plan   ANILA :  - Baseline creatinine unknown. was on Bactrim prior to admission.   - Looks terminally ill and very dehydrated. - continue w/ Hypotonic fluids   - Renal ultrasound: No obstruction  - Not a candidate for dialysis due to severe debilitated condition   - suggest Palliative care consult and re-consider Hospice      Hyponatremia:  -Secondary to dehydration  -Agree with hypotonic fluids      Opacification of left lung  -Per primary team     Hx of CVA  Encephalopathy  Dementia  -MX per primary team     Hypoglycemia  Hypothermia  -Likely 2/2 infection     Recent UTI  -Antibiotics per primary team     Care Plan discussed with: Dr. Omer Mar     Interval History:  Seen and examined,. Remains obtunded. Hypothermic and hypoglycemic overnight. Na trending down. BUN and Cr unchanged     Review of Systems: Review of systems not obtained due to patient factors.     Current Medications:   Current Facility-Administered Medications   Medication Dose Route Frequency    dextrose 10% infusion  50 mL/hr IntraVENous CONTINUOUS    sodium chloride (NS) flush 5-40 mL  5-40 mL IntraVENous Q8H    sodium chloride (NS) flush 5-40 mL  5-40 mL IntraVENous PRN    acetaminophen (TYLENOL) tablet 650 mg  650 mg Oral Q6H PRN    Or    acetaminophen (TYLENOL) suppository 650 mg  650 mg Rectal Q6H PRN    polyethylene glycol (MIRALAX) packet 17 g  17 g Oral DAILY PRN    ondansetron (ZOFRAN ODT) tablet 4 mg  4 mg Oral Q8H PRN    Or    ondansetron (ZOFRAN) injection 4 mg  4 mg IntraVENous Q6H PRN    cefTRIAXone (ROCEPHIN) 1 g in 0.9% sodium chloride (MBP/ADV) 50 mL MBP  1 g IntraVENous Q24H    glucose chewable tablet 16 g  4 Tablet Oral PRN    dextrose (D50W) injection syrg 12.5-25 g  12.5-25 g IntraVENous PRN    glucagon (GLUCAGEN) injection 1 mg  1 mg IntraMUSCular PRN     Current Outpatient Medications   Medication Sig    amLODIPine (NORVASC) 10 mg tablet Take 10 mg by mouth daily.  aspirin 81 mg chewable tablet Take 81 mg by mouth daily.  atorvastatin (LIPITOR) 80 mg tablet Take 80 mg by mouth daily.  cetirizine (ZYRTEC) 5 mg tablet Take 5 mg by mouth daily as needed for Allergies.  cyanocobalamin 1,000 mcg tablet Take 1,000 mcg by mouth daily.  famotidine (PEPCID) 10 mg tablet Take 10 mg by mouth nightly.  ferrous sulfate 325 mg (65 mg iron) tablet Take 325 mg by mouth Daily (before breakfast).  furosemide (Lasix) 20 mg tablet Take 20 mg by mouth every other day.  guaiFENesin (ORGANIDIN) 200 mg tablet Take 200 mg by mouth every four (4) hours as needed for Congestion.  hydrALAZINE (APRESOLINE) 25 mg tablet Take 25 mg by mouth three (3) times daily.  isosorbide dinitrate (ISORDIL) 20 mg tablet Take 20 mg by mouth three (3) times daily.  loratadine (CLARITIN) 10 mg tablet Take 10 mg by mouth daily as needed for Allergies.  metoprolol tartrate (LOPRESSOR) 50 mg tablet Take 50 mg by mouth two (2) times a day.  tamsulosin (Flomax) 0.4 mg capsule Take 0.4 mg by mouth nightly. No Known Allergies    Objective:  Vitals:    Vitals:    09/13/21 2000 09/13/21 2204 09/14/21 0022 09/14/21 0416   BP:  137/83  (!) 145/83   Pulse:  71 67 67   Resp:  16 13 18   Temp:    (!) 94.7 °F (34.8 °C)   SpO2: (!) 88% 97% 100% 96%     Intake and Output:  No intake/output data recorded.   09/12 1901 - 09/14 0700  In: -   Out: 250 [Urine:250]    Physical Examination:  General: Cachectic and terminal looking  HEENT: PERRL++Pallor , No Icterus  Neck: Supple,no mass palpable  Lungs : Diminished bilaterally  CVS: RRR, S1 S2 normal, No murmur   Abdomen: Soft, NT, BS +  Extremities: no Edema  Neurologic: Obtunded, bilateral contractures  Psych: Unable to assess    []    High complexity decision making was performed  []    Patient is at high-risk of decompensation with multiple organ involvement    Lab Data Personally Reviewed: I have reviewed all the pertinent labs, microbiology data and radiology studies during assessment.     Recent Labs     09/14/21  0239 09/13/21  1618 09/13/21  0942 09/13/21  0559   * 157*  --  153*   K 4.8 4.8  --  4.8   * 127*  --  124*   CO2 18* 20*  --  20*   GLU 75 133*  --  13*   * 133*  --  122*   CREA 4.27* 4.23*  --  4.01*   CA 6.1* 6.8*  --  6.9*   MG  --   --   --  2.4   PHOS  --   --   --  8.5*   ALB 1.8*  --  2.0* 1.9*   *  --  928* 755*     Recent Labs     09/14/21  0239 09/13/21  0559   WBC 6.8 5.4   HGB 8.2* 8.6*   HCT 25.2* 26.5*   PLT 66* 66*     No results found for: SDES  Lab Results   Component Value Date/Time    Culture result: NO GROWTH AFTER 19 HOURS 09/13/2021 09:42 AM     Recent Results (from the past 24 hour(s))   GLUCOSE, POC    Collection Time: 09/13/21  7:10 AM   Result Value Ref Range    Glucose (POC) 153 (H) 65 - 117 mg/dL    Performed by Paz Gale    GLUCOSE, POC    Collection Time: 09/13/21  9:29 AM   Result Value Ref Range    Glucose (POC) 99 65 - 117 mg/dL    Performed by Sheila Najera    EKG, 12 LEAD, INITIAL    Collection Time: 09/13/21  9:29 AM   Result Value Ref Range    Ventricular Rate 67 BPM    Atrial Rate 67 BPM    P-R Interval 184 ms    QRS Duration 78 ms    Q-T Interval 450 ms    QTC Calculation (Bezet) 475 ms    Calculated P Axis 60 degrees    Calculated R Axis 81 degrees    Calculated T Axis 93 degrees    Diagnosis       Normal sinus rhythm  Nonspecific T wave abnormality  Abnormal ECG  No previous ECGs available  Confirmed by Melonie Bennett MD. ([id number]) on 9/13/2021 11:32:40 AM     CULTURE, BLOOD, PAIRED    Collection Time: 09/13/21  9:42 AM    Specimen: Blood   Result Value Ref Range    Special Requests: NO SPECIAL REQUESTS      Culture result: NO GROWTH AFTER [address]    Collection Time: 09/13/21  9:42 AM   Result Value Ref Range    Cortisol, random 35.3 ug/dL   HEPATIC FUNCTION PANEL    Collection Time: 09/13/21  9:42 AM   Result Value Ref Range    Protein, total 6.0 (L) 6.4 - 8.2 g/dL    Albumin 2.0 (L) 3.5 - 5.0 g/dL    Globulin 4.0 2.0 - 4.0 g/dL    A-G Ratio 0.5 (L) 1.1 - 2.2      Bilirubin, total 0.4 0.2 - 1.0 MG/DL    Bilirubin, direct 0.2 0.0 - 0.2 MG/DL    Alk. phosphatase 1,303 (H) 45 - 117 U/L    AST (SGOT) >2,000 (H) 15 - 37 U/L    ALT (SGPT) 928 (H) 12 - 78 U/L   COVID-19 RAPID TEST    Collection Time: 09/13/21  9:43 AM   Result Value Ref Range    Specimen source Nasopharyngeal      COVID-19 rapid test Not detected NOTD     GLUCOSE, POC    Collection Time: 09/13/21 10:50 AM   Result Value Ref Range    Glucose (POC) 81 65 - 117 mg/dL    Performed by Behzad Monteiro    URINALYSIS W/MICROSCOPIC    Collection Time: 09/13/21 11:37 AM   Result Value Ref Range    Color YELLOW/STRAW      Appearance CLOUDY (A) CLEAR      Specific gravity 1.015 1.003 - 1.030      pH (UA) 7.0 5.0 - 8.0      Protein 300 (A) NEG mg/dL    Glucose Negative NEG mg/dL    Ketone Negative NEG mg/dL    Bilirubin Negative NEG      Blood SMALL (A) NEG      Urobilinogen 0.2 0.2 - 1.0 EU/dL    Nitrites Positive (A) NEG      Leukocyte Esterase MODERATE (A) NEG      WBC 10-20 0 - 4 /hpf    RBC 0-5 0 - 5 /hpf    Epithelial cells FEW FEW /lpf    Bacteria 4+ (A) NEG /hpf   URINE CULTURE HOLD SAMPLE    Collection Time: 09/13/21 11:37 AM    Specimen: Serum; Urine   Result Value Ref Range    Urine culture hold        Urine on hold in Microbiology dept for 2 days. If unpreserved urine is submitted, it cannot be used for addtional testing after 24 hours, recollection will be required.    GLUCOSE, POC    Collection Time: 09/13/21 12:06 PM   Result Value Ref Range    Glucose (POC) 65 65 - 117 mg/dL    Performed by Faustino ARIZA)    GLUCOSE, POC    Collection Time: 09/13/21 12:12 PM Result Value Ref Range    Glucose (POC) 68 65 - 117 mg/dL    Performed by Ld Billow (CON)    GLUCOSE, POC    Collection Time: 09/13/21 12:38 PM   Result Value Ref Range    Glucose (POC) 76 65 - 117 mg/dL    Performed by Ld Billow (CON)    GLUCOSE, POC    Collection Time: 09/13/21  1:57 PM   Result Value Ref Range    Glucose (POC) 54 (L) 65 - 117 mg/dL    Performed by Ld Billow (CON)    GLUCOSE, POC    Collection Time: 09/13/21  2:00 PM   Result Value Ref Range    Glucose (POC) 56 (L) 65 - 117 mg/dL    Performed by Ld Billow (CON)    GLUCOSE, POC    Collection Time: 09/13/21  2:28 PM   Result Value Ref Range    Glucose (POC) 57 (L) 65 - 117 mg/dL    Performed by Ld Billow (CON)    GLUCOSE, POC    Collection Time: 09/13/21  2:30 PM   Result Value Ref Range    Glucose (POC) 66 65 - 117 mg/dL    Performed by Ld Billow (CON)    GLUCOSE, POC    Collection Time: 09/13/21  3:12 PM   Result Value Ref Range    Glucose (POC) 53 (LL) 65 - 117 mg/dL    Performed by Rahel Sarita    GLUCOSE, POC    Collection Time: 09/13/21  3:16 PM   Result Value Ref Range    Glucose (POC) 57 (L) 65 - 117 mg/dL    Performed by [employer]    GLUCOSE, POC    Collection Time: 09/13/21  3:34 PM   Result Value Ref Range    Glucose (POC) 45 (LL) 65 - 117 mg/dL    Performed by Ld Billow (CON)    GLUCOSE, POC    Collection Time: 09/13/21  3:37 PM   Result Value Ref Range    Glucose (POC) 44 (LL) 65 - 117 mg/dL    Performed by Ld Billow (CON)    GLUCOSE, POC    Collection Time: 09/13/21  3:53 PM   Result Value Ref Range    Glucose (POC) 207 (H) 65 - 117 mg/dL    Performed by [employer]    METABOLIC PANEL, BASIC    Collection Time: 09/13/21  4:18 PM   Result Value Ref Range    Sodium 157 (H) 136 - 145 mmol/L    Potassium 4.8 3.5 - 5.1 mmol/L    Chloride 127 (H) 97 - 108 mmol/L    CO2 20 (L) 21 - 32 mmol/L    Anion gap 10 5 - 15 mmol/L    Glucose 133 (H) 65 - 100 mg/dL     (H) 6 - 20 MG/DL    Creatinine 4.23 (H) 0.70 - 1.30 MG/DL    BUN/Creatinine ratio 31 (H) 12 - 20      GFR est AA 17 (L) >60 ml/min/1.73m2    GFR est non-AA 14 (L) >60 ml/min/1.73m2    Calcium 6.8 (L) 8.5 - 10.1 MG/DL   GLUCOSE, POC    Collection Time: 09/13/21  4:21 PM   Result Value Ref Range    Glucose (POC) 123 (H) 65 - 117 mg/dL    Performed by [employer]    GLUCOSE, POC    Collection Time: 09/13/21  5:38 PM   Result Value Ref Range    Glucose (POC) 79 65 - 117 mg/dL    Performed by [employer]    GLUCOSE, POC    Collection Time: 09/13/21  6:52 PM   Result Value Ref Range    Glucose (POC) 63 (L) 65 - 117 mg/dL    Performed by [employer]    GLUCOSE, POC    Collection Time: 09/13/21  7:13 PM   Result Value Ref Range    Glucose (POC) 177 (H) 65 - 117 mg/dL    Performed by [employer]    GLUCOSE, POC    Collection Time: 09/13/21  7:59 PM   Result Value Ref Range    Glucose (POC) 103 65 - 117 mg/dL    Performed by [employer], POC    Collection Time: 09/13/21  9:27 PM   Result Value Ref Range    Glucose (POC) 80 65 - 117 mg/dL    Performed by [id number], POC    Collection Time: 09/13/21 11:18 PM   Result Value Ref Range    Glucose (POC) 110 65 - 117 mg/dL    Performed by [employer], POC    Collection Time: 09/14/21 12:38 AM   Result Value Ref Range    Glucose (POC) 100 65 - 117 mg/dL    Performed by [id number], POC    Collection Time: 09/14/21  2:01 AM   Result Value Ref Range    Glucose (POC) 87 65 - 117 mg/dL    Performed by Galdino León, COMPREHENSIVE    Collection Time: 09/14/21  2:39 AM   Result Value Ref Range    Sodium 151 (H) 136 - 145 mmol/L    Potassium 4.8 3.5 - 5.1 mmol/L    Chloride 120 (H) 97 - 108 mmol/L    CO2 18 (L) 21 - 32 mmol/L    Anion gap 13 5 - 15 mmol/L    Glucose 75 65 - 100 mg/dL     (H) 6 - 20 MG/DL    Creatinine 4.27 (H) 0.70 - 1.30 MG/DL    BUN/Creatinine ratio 31 (H) 12 - 20      GFR est AA 16 (L) >60 ml/min/1.73m2    GFR est non-AA 14 (L) >60 ml/min/1.73m2    Calcium 6.1 (LL) 8.5 - 10.1 MG/DL    Bilirubin, total 0.2 0.2 - 1.0 MG/DL    ALT (SGPT) 569 (H) 12 - 78 U/L    AST (SGOT) 1,284 (H) 15 - 37 U/L    Alk. phosphatase 954 (H) 45 - 117 U/L    Protein, total 5.9 (L) 6.4 - 8.2 g/dL    Albumin 1.8 (L) 3.5 - 5.0 g/dL    Globulin 4.1 (H) 2.0 - 4.0 g/dL    A-G Ratio 0.4 (L) 1.1 - 2.2     CBC WITH AUTOMATED DIFF    Collection Time: 09/14/21  2:39 AM   Result Value Ref Range    WBC 6.8 4.1 - 11.1 K/uL    RBC 2.97 (L) 4.10 - 5.70 M/uL    HGB 8.2 (L) 12.1 - 17.0 g/dL    HCT 25.2 (L) 36.6 - 50.3 %    MCV 84.8 80.0 - 99.0 FL    MCH 27.6 26.0 - 34.0 PG    MCHC 32.5 30.0 - 36.5 g/dL    RDW 14.7 (H) 11.5 - 14.5 %    PLATELET 66 (L) 420 - 400 K/uL    NRBC 0.6 (H) 0  WBC    ABSOLUTE NRBC 0.04 (H) 0.00 - 0.01 K/uL    NEUTROPHILS 87 (H) 32 - 75 %    BAND NEUTROPHILS 4 0 - 6 %    LYMPHOCYTES 6 (L) 12 - 49 %    MONOCYTES 3 (L) 5 - 13 %    EOSINOPHILS 0 0 - 7 %    BASOPHILS 0 0 - 1 %    IMMATURE GRANULOCYTES 0 %    ABS. NEUTROPHILS 6.2 1.8 - 8.0 K/UL    ABS. LYMPHOCYTES 0.4 (L) 0.8 - 3.5 K/UL    ABS. MONOCYTES 0.2 0.0 - 1.0 K/UL    ABS. EOSINOPHILS 0.0 0.0 - 0.4 K/UL    ABS. BASOPHILS 0.0 0.0 - 0.1 K/UL    ABS. IMM. GRANS. 0.0 K/UL    DF MANUAL      RBC COMMENTS ANISOCYTOSIS  1+        RBC COMMENTS EEMRITA CELLS  PRESENT       GLUCOSE, POC    Collection Time: 09/14/21  4:20 AM   Result Value Ref Range    Glucose (POC) 83 65 - 117 mg/dL    Performed by Eula Marinelli            Total time spent with patient:  xxx   min. Care Plan discussed with:  Patient     Family      RN      Consulting Physician [facility],         I have reviewed the flowsheets. Chart and Pertinent Notes have been reviewed. No change in PMH ,family and social history from Consult note.       Gely Rene MD

## 2021-09-14 NOTE — PROGRESS NOTES
Pts o2 turned off by RN, Pt maintaining O2 levels above 93%. Pt had a bowel movement in bed. Bedding and gown changed. Visitor at bedside.

## 2021-09-14 NOTE — PROGRESS NOTES
9/14/2021 -   NORMAN:  - RUR: 15%  - Disposition is TBD: potential discharge to home with home hospice  - [facility] will require new orders  - Patient will need BLS transport  - Patient will need IM Letter prior to discharge  - Primary Contact: daughterScooter: [phone]    - Labs are being monitored  - Hypotonic fluids continue  - Palliative Care [address] discussions continue with family  - Per Nephro, patient is not a HD candidate  CRM: Jennifer Burgos, MPH, [address] [place] [address]; Z: [phone]

## 2021-09-14 NOTE — PROGRESS NOTES
Hospitalist Progress Note  Cheli Wayne MD  Answering service: [phone] from in house phone      Date of Service:  2021  NAME:  Ryley Hays  :  1945  MRN:  [id number]    Admission Summary:   75M was on hospice, not eating, cachectic a/w AMS  Interval history / Subjective:   Patient seen and examined at bedside, looks cachectic, chronically very sick looking. This Writer thinks that at this stage patient's condition is futile and recommending comfort/ compassionate approach going forward. Palliative care following conversations with family/patient     Assessment & Plan:     Acute metabolic encephalopathy with underlying dementia  Severe symptomatic hypoglycemia  Diarrhea  Hypernatremia  ANILA, unknown baseline - US: no obstruction. Deranged LFTs with normal bilirubin- monitor LFTs  Complete opacification of the left hemithorax. Recent diagnosis of UTI  Thrombocytopenia  #. Hypoglycemia: D10 infusion. Monitor sugars. -- Blood sugar and blood pressure improved. Continue D5, increased rate. Monitor blood sugar hourly for the next few hours then can change to every 2every 3 hourly  -- Maintain Annel hugger for hypothermia. Closely monitor electrolytes. -- Keep n.p.o- Aspiration and fall precautions- Obtain UA, blood culture, COVID -ve.  -- acute hepatitis panel pending. abdominal ultrasound: no acute findings. -- Nephrology following. ICU evaluated. Code status: DNR  DVT prophylaxis: SCD  Care Plan discussed with: Patient/Family and Nurse  Disposition: TBD 1-2days     Hospital Problems  Never Reviewed        Codes Class Noted POA    Hypothermia ICD-10-CM: T68. XXXA  ICD-9-CM: 991.6  2021 Unknown        Hypoglycemia ICD-10-CM: E16.2  ICD-9-CM: 251.2  2021 Unknown        Elevated liver enzymes ICD-10-CM: R74.8  ICD-9-CM: 790.5  2021 Unknown        Acute metabolic encephalopathy YFO-84-YV: G93.41  ICD-9-CM: 348.31  9/13/2021 Unknown            Review of Systems:   Pertinent items are mentioned in interval history. Vital Signs:    Last 24hrs VS reviewed since prior progress note. Most recent are:  Visit Vitals  BP (!) 145/83   Pulse 67   Temp (!) 94.7 °F (34.8 °C)   Resp 18   SpO2 96%         Intake/Output Summary (Last 24 hours) at 9/14/2021 1431  Last data filed at 9/14/2021 0318  Gross per 24 hour   Intake    Output 250 ml   Net -250 ml        Physical Examination:   Evaluated face to face and examined 09/14/21    General:  Alert, confused, No acute distress. Cachectic, very slim/wasted elderly BM  Card:  S1, S2, No murmurs, good peripheral perfusion  Resp:  No accessory muscle use, Good AE, no wheezes. no crepitations  Abd:  Soft, non-tender, non-distended, BS+  Extremities:  No cyanosis or clubbing, no edema  Neuro:  confused, not much interactive. Cachectic wasted. No clear focal neurodeficits  Psych:  unable to assess. Data Review:    Review and/or order of clinical lab test  Review and/or order of tests in the radiology section of CPT  Review and/or order of tests in the medicine section of CPT  Labs:     Recent Labs     09/14/21 0239 09/13/21  0559   WBC 6.8 5.4   HGB 8.2* 8.6*   HCT 25.2* 26.5*   PLT 66* 66*     Recent Labs     09/14/21 0239 09/13/21  1618 09/13/21  0559   * 157* 153*   K 4.8 4.8 4.8   * 127* 124*   CO2 18* 20* 20*   * 133* 122*   CREA 4.27* 4.23* 4.01*   GLU 75 133* 13*   CA 6.1* 6.8* 6.9*   MG  --   --  2.4   PHOS  --   --  8.5*     Recent Labs     09/14/21 0239 09/13/21  0942 09/13/21  0559   * 928* 755*   * 1,303* 1,186*   TBILI 0.2 0.4 0.5   TP 5.9* 6.0* 6.3*   ALB 1.8* 2.0* 1.9*   GLOB 4.1* 4.0 4.4*     No results for input(s): INR, PTP, APTT, INREXT in the last 72 hours. No results for input(s): FE, TIBC, PSAT, FERR in the last 72 hours. No results found for: FOL, RBCF   No results for input(s): PH, PCO2, PO2 in the last 72 hours.   Recent Labs     09/13/21  0559   *   TROIQ <0.05     No results found for: CHOL, CHOLX, CHLST, CHOLV, HDL, HDLP, LDL, LDLC, DLDLP, TGLX, TRIGL, TRIGP, CHHD, CHHDX  Lab Results   Component Value Date/Time    Glucose (POC) 96 09/14/2021 02:09 PM    Glucose (POC) 81 09/14/2021 08:43 AM    Glucose (POC) 83 09/14/2021 04:20 AM    Glucose (POC) 87 09/14/2021 02:01 AM    Glucose (POC) 100 09/14/2021 12:38 AM     Lab Results   Component Value Date/Time    Color YELLOW/STRAW 09/13/2021 11:37 AM    Appearance CLOUDY (A) 09/13/2021 11:37 AM    Specific gravity 1.015 09/13/2021 11:37 AM    pH (UA) 7.0 09/13/2021 11:37 AM    Protein 300 (A) 09/13/2021 11:37 AM    Glucose Negative 09/13/2021 11:37 AM    Ketone Negative 09/13/2021 11:37 AM    Bilirubin Negative 09/13/2021 11:37 AM    Urobilinogen 0.2 09/13/2021 11:37 AM    Nitrites Positive (A) 09/13/2021 11:37 AM    Leukocyte Esterase MODERATE (A) 09/13/2021 11:37 AM    Epithelial cells FEW 09/13/2021 11:37 AM    Bacteria 4+ (A) 09/13/2021 11:37 AM    WBC 10-20 09/13/2021 11:37 AM    RBC 0-5 09/13/2021 11:37 AM     Medications Reviewed:     Current Facility-Administered Medications   Medication Dose Route Frequency    dextrose 10% infusion  50 mL/hr IntraVENous CONTINUOUS    calcium gluconate 2 g/100 mL sodium chloride (ISO-OSM)  2 g IntraVENous ONCE    sodium chloride (NS) flush 5-40 mL  5-40 mL IntraVENous Q8H    sodium chloride (NS) flush 5-40 mL  5-40 mL IntraVENous PRN    acetaminophen (TYLENOL) tablet 650 mg  650 mg Oral Q6H PRN    Or    acetaminophen (TYLENOL) suppository 650 mg  650 mg Rectal Q6H PRN    polyethylene glycol (MIRALAX) packet 17 g  17 g Oral DAILY PRN    ondansetron (ZOFRAN ODT) tablet 4 mg  4 mg Oral Q8H PRN    Or    ondansetron (ZOFRAN) injection 4 mg  4 mg IntraVENous Q6H PRN    cefTRIAXone (ROCEPHIN) 1 g in 0.9% sodium chloride (MBP/ADV) 50 mL MBP  1 g IntraVENous Q24H    glucose chewable tablet 16 g  4 Tablet Oral PRN    dextrose (D50W) injection syrg 12.5-25 g  12.5-25 g IntraVENous PRN    glucagon (GLUCAGEN) injection 1 mg  1 mg IntraMUSCular PRN     Current Outpatient Medications   Medication Sig    amLODIPine (NORVASC) 10 mg tablet Take 10 mg by mouth daily.  aspirin 81 mg chewable tablet Take 81 mg by mouth daily.  atorvastatin (LIPITOR) 80 mg tablet Take 80 mg by mouth daily.  cetirizine (ZYRTEC) 5 mg tablet Take 5 mg by mouth daily as needed for Allergies.  cyanocobalamin 1,000 mcg tablet Take 1,000 mcg by mouth daily.  famotidine (PEPCID) 10 mg tablet Take 10 mg by mouth nightly.  ferrous sulfate 325 mg (65 mg iron) tablet Take 325 mg by mouth Daily (before breakfast).  furosemide (Lasix) 20 mg tablet Take 20 mg by mouth every other day.  guaiFENesin (ORGANIDIN) 200 mg tablet Take 200 mg by mouth every four (4) hours as needed for Congestion.  hydrALAZINE (APRESOLINE) 25 mg tablet Take 25 mg by mouth three (3) times daily.  isosorbide dinitrate (ISORDIL) 20 mg tablet Take 20 mg by mouth three (3) times daily.  loratadine (CLARITIN) 10 mg tablet Take 10 mg by mouth daily as needed for Allergies.  metoprolol tartrate (LOPRESSOR) 50 mg tablet Take 50 mg by mouth two (2) times a day.     tamsulosin (Flomax) 0.4 mg capsule Take 0.4 mg by mouth nightly.   ______________________________________________________________________  EXPECTED LENGTH OF STAY: - - -  ACTUAL LENGTH OF STAY:          1               Brianne Fragoso MD

## 2021-09-15 NOTE — ED NOTES
Sheets, gown, and brief changed. Patient had loose BM. Song care completed. Patient repositioned onto back from R side. Pillows placed under pressure points and between knees. New mepilex placed on pressure ulcer on L hip. Patient bathed. Awaiting further dispo.

## 2021-09-15 NOTE — PROGRESS NOTES
[facility]   [address] [facility], [address] [place] [address], [facility]  Phone: [phone]   [phone]       Nephrology Progress Note  Fay Chi     1945     [id number]  Date of Admission : 9/13/2021  09/15/21    CC: Follow up for ARF       Assessment and Plan   ANILA :  - Baseline creatinine unknown. was on Bactrim prior to admission.   - Looks terminally ill and  very dry   - continue w/ Hypotonic fluids : changed to D5W -- he needs higher rate of IVF   - Renal ultrasound: No obstruction  - Not a candidate for dialysis due to severe debilitated condition   - suggest Palliative care consult and re-consider Hospice      Hyponatremia:  -Secondary to dehydration  -Agree with hypotonic fluids      Opacification of left lung  -Per primary team     Hx of CVA  Encephalopathy  Dementia  -MX per primary team     Hypoglycemia  Hypothermia  -Likely 2/2 infection     Recent UTI  -Antibiotics per primary team     Care Plan discussed with: Dr. Omer Mar     Interval History:  Seen and examined,. Remains obtunded. Stable BG. No labs today . Review of Systems: Review of systems not obtained due to patient factors.     Current Medications:   Current Facility-Administered Medications   Medication Dose Route Frequency    dextrose 10% infusion  50 mL/hr IntraVENous CONTINUOUS    sodium chloride (NS) flush 5-40 mL  5-40 mL IntraVENous Q8H    sodium chloride (NS) flush 5-40 mL  5-40 mL IntraVENous PRN    acetaminophen (TYLENOL) tablet 650 mg  650 mg Oral Q6H PRN    Or    acetaminophen (TYLENOL) suppository 650 mg  650 mg Rectal Q6H PRN    polyethylene glycol (MIRALAX) packet 17 g  17 g Oral DAILY PRN    ondansetron (ZOFRAN ODT) tablet 4 mg  4 mg Oral Q8H PRN    Or    ondansetron (ZOFRAN) injection 4 mg  4 mg IntraVENous Q6H PRN    cefTRIAXone (ROCEPHIN) 1 g in 0.9% sodium chloride (MBP/ADV) 50 mL MBP  1 g IntraVENous Q24H    glucose chewable tablet 16 g  4 Tablet Oral PRN    dextrose (D50W) injection syrg 12.5-25 g  12.5-25 g IntraVENous PRN    glucagon (GLUCAGEN) injection 1 mg  1 mg IntraMUSCular PRN     Current Outpatient Medications   Medication Sig    amLODIPine (NORVASC) 10 mg tablet Take 10 mg by mouth daily.  aspirin 81 mg chewable tablet Take 81 mg by mouth daily.  atorvastatin (LIPITOR) 80 mg tablet Take 80 mg by mouth daily.  cetirizine (ZYRTEC) 5 mg tablet Take 5 mg by mouth daily as needed for Allergies.  cyanocobalamin 1,000 mcg tablet Take 1,000 mcg by mouth daily.  famotidine (PEPCID) 10 mg tablet Take 10 mg by mouth nightly.  ferrous sulfate 325 mg (65 mg iron) tablet Take 325 mg by mouth Daily (before breakfast).  furosemide (Lasix) 20 mg tablet Take 20 mg by mouth every other day.  guaiFENesin (ORGANIDIN) 200 mg tablet Take 200 mg by mouth every four (4) hours as needed for Congestion.  hydrALAZINE (APRESOLINE) 25 mg tablet Take 25 mg by mouth three (3) times daily.  isosorbide dinitrate (ISORDIL) 20 mg tablet Take 20 mg by mouth three (3) times daily.  loratadine (CLARITIN) 10 mg tablet Take 10 mg by mouth daily as needed for Allergies.  metoprolol tartrate (LOPRESSOR) 50 mg tablet Take 50 mg by mouth two (2) times a day.  tamsulosin (Flomax) 0.4 mg capsule Take 0.4 mg by mouth nightly. No Known Allergies    Objective:  Vitals:    Vitals:    09/15/21 0100 09/15/21 0200 09/15/21 0400 09/15/21 0700   BP: 139/72 132/72 137/72 (!) 140/74   Pulse: 72 71 68 71   Resp: 16 16 15 15   Temp:   98.6 °F (37 °C) 98.6 °F (37 °C)   SpO2: 96% 98% 98% 98%     Intake and Output:  No intake/output data recorded.   09/13 1901 - 09/15 0700  In: 150 [I.V.:150]  Out: 1050 [Urine:1050]    Physical Examination:  General: Cachectic and terminal looking  HEENT: PERRL++Pallor , No Icterus  Neck: Supple,no mass palpable  Lungs : Diminished bilaterally  CVS: RRR, S1 S2 normal, No murmur   Abdomen: Soft, NT, BS +  Extremities: no Edema  Neurologic: Obtunded, bilateral contractures  Psych: Unable to assess    []    High complexity decision making was performed  []    Patient is at high-risk of decompensation with multiple organ involvement    Lab Data Personally Reviewed: I have reviewed all the pertinent labs, microbiology data and radiology studies during assessment.     Recent Labs     09/14/21  0239 09/13/21  1618 09/13/21  0942 09/13/21  0559   * 157*  --  153*   K 4.8 4.8  --  4.8   * 127*  --  124*   CO2 18* 20*  --  20*   GLU 75 133*  --  13*   * 133*  --  122*   CREA 4.27* 4.23*  --  4.01*   CA 6.1* 6.8*  --  6.9*   MG  --   --   --  2.4   PHOS  --   --   --  8.5*   ALB 1.8*  --  2.0* 1.9*   *  --  928* 755*     Recent Labs     09/14/21  0239 09/13/21  0559   WBC 6.8 5.4   HGB 8.2* 8.6*   HCT 25.2* 26.5*   PLT 66* 66*     No results found for: SDES  Lab Results   Component Value Date/Time    Culture result: GRAM NEGATIVE RODS (A) 09/13/2021 11:37 AM    Culture result: CHECKING FOR POSSIBLE PROTEUS SPECIES (A) 09/13/2021 11:37 AM    Culture result: NO GROWTH 2 DAYS 09/13/2021 09:42 AM     Recent Results (from the past 24 hour(s))   GLUCOSE, POC    Collection Time: 09/14/21  2:09 PM   Result Value Ref Range    Glucose (POC) 96 65 - 117 mg/dL    Performed by Nir Martínez, POC    Collection Time: 09/14/21  6:07 PM   Result Value Ref Range    Glucose (POC) 99 65 - 117 mg/dL    Performed by Karina Brito, POC    Collection Time: 09/14/21  8:49 PM   Result Value Ref Range    Glucose (POC) 89 65 - 117 mg/dL    Performed by Morgan Morales    GLUCOSE, POC    Collection Time: 09/14/21 11:40 PM   Result Value Ref Range    Glucose (POC) 84 65 - 117 mg/dL    Performed by Morgan Morales    GLUCOSE, POC    Collection Time: 09/15/21  3:49 AM   Result Value Ref Range    Glucose (POC) 85 65 - 117 mg/dL    Performed by Jose G Hernández    GLUCOSE, POC    Collection Time: 09/15/21  7:49 AM   Result Value Ref Range    Glucose (POC) 78 65 - 117 mg/dL    Performed by Lux Mejia   PCT            Total time spent with patient:  xxx   min. Care Plan discussed with:  Patient     Family      RN      Consulting Physician [facility],         I have reviewed the flowsheets. Chart and Pertinent Notes have been reviewed. No change in PMH ,family and social history from Consult note.       Eden Zimmer MD

## 2021-09-15 NOTE — ED NOTES
Bedside and Verbal shift change report given to Lacey Morris RN (oncoming nurse) by Eddie Lamb RN (offgoing nurse). Report included the following information SBAR, ED Summary, MAR and Recent Results.

## 2021-09-15 NOTE — PROGRESS NOTES
Palliative Medicine Consult  Aparicio: [phone] ([phone])    Patient Name: Eli Amador  YOB: 1945    Date of Initial Consult: 9/13/21  Reason for Consult: Care decisions  Requesting Provider: Dr. Blaine Gomez  Primary Care Physician: Lety Avilez MD     SUMMARY:   Eli Amador is a 67 y.o. male with a past history of hypertension, diabetes mellitus, dysphagia, aspiration, failure to thrive, right sided weakness, right eye blindness, and dementia, who was admitted on 9/13/2021from home with a diagnosis of hypoglycemia, hypothermia, hypotension, UTI, ANILA, and abnormal LFTs. His daughter reported that he had recently been diagnosed with an UTI and started antibiotics, which caused diarrhea. He presented with complaints of altered mental status. He was found to be hypoglycemic, hypotensive, with ANILA, and abnormal LFTs. He was also found to have a complete opacification on the left per CXR. He was placed under a warming blanket and started on D5W IV fluids. Current medical issues leading to Palliative Medicine involvement include: multiple acute medical issues, failure to thrive. Social: He has a daughter, Jody Mcburney, and a son, Eliseo Veliz. PALLIATIVE DIAGNOSES:   1. Palliative care encounter  2. Goals of care  3. End of life care  4. Frequent hospitalizations  5. Altered mental status  6. Hypoglycemia  7. UTI  8. Dehydration, ANILA  9. Poor PO intake prior to admit  10. Diarrhea  11. Frailty  12. Failure to thrive       PLAN:   Patient seen in follow up and case discussed with Dr. Allyson Brooks. He remains poorly responsive with increased secretions and noisy breathing. His renal function continues to worsen. Call placed to rene Scooter Cohen and medical update given. We talked about the recommendation to stop the dextrose IV fluids and transition him to comfort care. Jody Mcburney had visited yesterday and agreed that he did not look good.  I explained that his kidney function continues to worsen and that we do not forsee him getting better. Darren Victor is in agreement to stop the IV fluids and transition to comfort. We talked about the plan and I explained that his blood glucose will likely drop quickly once the dextrose IV fluids are stopped. Darren Victor would like to be with the patient when we stop the fluids and transition to comfort care. Plan made to transition to comfort measures only once she arrives at the hospital, which should be by 1:00pm today. Will check back once she arrives to confirm the plan and place comfort orders. Communicated plan of care with: Palliative IDT, [facility] Health Care Team    Addendum:  I came down to the patient's room at 2:15pm to meet with his daughter and initiate comfort measures, but his daughter was not here. I called her and she reports that she will not be arriving until closer to 3:30pm. Plan made for me to check back then. Addendum #2:   I came back to see the patient at 3:45pm. He was alone in the room and his daughter was still not here. I waited in the ED until 4:15pm, but she was still not here. Will ask nursing to page the hospitalist once the family arrives to put comfort measure orders in if they are still agreeable to this plan. GOALS OF CARE / TREATMENT PREFERENCES:     GOALS OF CARE:  Patient/Health Care Proxy Stated Goals: Comfort    TREATMENT PREFERENCES:   Code Status: DNR    Advance Care Planning:  [x] [facility] Interdisciplinary Team has updated the ACP Navigator with Health Care Decision Maker and Patient Capacity      Primary Decision Maker: Karel López" - Daughter - [phone]    Primary Decision Maker: Kwesi Mina - Son  No flowsheet data found. Medical Interventions: Comfort measures     Other Instructions: Other:    As far as possible, the palliative care team has discussed with patient / health care proxy about goals of care / treatment preferences for patient.      HISTORY:     History obtained from: chart    CHIEF COMPLAINT: Altered mental status    HPI/SUBJECTIVE:    The patient is:   [] Verbal and participatory  [x] Non-participatory due to: altered mental status, clinical condition    Clinical Pain Assessment (nonverbal scale for severity on nonverbal patients):   Clinical Pain Assessment  Severity: 0     Activity (Movement): Lying quietly, normal position    Duration: for how long has pt been experiencing pain (e.g., 2 days, 1 month, years)  Frequency: how often pain is an issue (e.g., several times per day, once every few days, constant)     FUNCTIONAL ASSESSMENT:     Palliative Performance Scale (PPS):  PPS: 30       PSYCHOSOCIAL/SPIRITUAL SCREENING:     Palliative IDT has assessed this patient for cultural preferences / practices and a referral made as appropriate to needs (Cultural Services, Patient Advocacy, Ethics, etc.)    Any spiritual / Mandaeism concerns:  [] Yes /  [x] No    Caregiver Burnout:  [] Yes /  [x] No /  [] No Caregiver Present      Anticipatory grief assessment:   [x] Normal  / [] Maladaptive       ESAS Anxiety:      ESAS Depression:          REVIEW OF SYSTEMS:     Positive and pertinent negative findings in ROS are noted above in HPI. The following systems were [] reviewed / [x] unable to be reviewed as noted in HPI  Other findings are noted below. Systems: constitutional, ears/nose/mouth/throat, respiratory, gastrointestinal, genitourinary, musculoskeletal, integumentary, neurologic, psychiatric, endocrine. Positive findings noted below. Modified ESAS Completed by: provider   Fatigue: 10 Drowsiness: 10     Pain: 0           Dyspnea: 1           Stool Occurrence(s): 1        PHYSICAL EXAM:     From RN flowsheet:  Wt Readings from Last 3 Encounters:   No data found for Wt     Blood pressure (!) 145/73, pulse 72, temperature 98.6 °F (37 °C), resp. rate 14, SpO2 95 %.     Pain Scale 1: Visual  Pain Intensity 1: 0                 Last bowel movement, if known:     Constitutional: sleeping quietly, frail, elderly, poorly responsive  Eyes: pupils equal, anicteric  ENMT: no nasal discharge, dry mucous membranes  Cardiovascular: regular rhythm  Respiratory: breathing not labored, symmetric  Musculoskeletal: no deformity, no tenderness to palpation  Skin: warm, dry  Neurologic: not following commands, moving all extremities  Psychiatric: unable to evaluate     HISTORY:     Active Problems:    Hypothermia (9/13/2021)      Hypoglycemia (9/13/2021)      Elevated liver enzymes (9/13/2021)      Acute metabolic encephalopathy (9/05/5319)      No past medical history on file. No past surgical history on file. No family history on file. History reviewed, no pertinent family history. Social History     Tobacco Use    Smoking status: Not on file   Substance Use Topics    Alcohol use: Not on file     No Known Allergies   Current Facility-Administered Medications   Medication Dose Route Frequency    dextrose 5% infusion  100 mL/hr IntraVENous CONTINUOUS    sodium chloride (NS) flush 5-40 mL  5-40 mL IntraVENous Q8H    sodium chloride (NS) flush 5-40 mL  5-40 mL IntraVENous PRN    acetaminophen (TYLENOL) tablet 650 mg  650 mg Oral Q6H PRN    Or    acetaminophen (TYLENOL) suppository 650 mg  650 mg Rectal Q6H PRN    polyethylene glycol (MIRALAX) packet 17 g  17 g Oral DAILY PRN    ondansetron (ZOFRAN ODT) tablet 4 mg  4 mg Oral Q8H PRN    Or    ondansetron (ZOFRAN) injection 4 mg  4 mg IntraVENous Q6H PRN    cefTRIAXone (ROCEPHIN) 1 g in 0.9% sodium chloride (MBP/ADV) 50 mL MBP  1 g IntraVENous Q24H    glucose chewable tablet 16 g  4 Tablet Oral PRN    dextrose (D50W) injection syrg 12.5-25 g  12.5-25 g IntraVENous PRN    glucagon (GLUCAGEN) injection 1 mg  1 mg IntraMUSCular PRN     Current Outpatient Medications   Medication Sig    amLODIPine (NORVASC) 10 mg tablet Take 10 mg by mouth daily.  aspirin 81 mg chewable tablet Take 81 mg by mouth daily.     atorvastatin (LIPITOR) 80 mg tablet Take 80 mg by mouth daily.  cetirizine (ZYRTEC) 5 mg tablet Take 5 mg by mouth daily as needed for Allergies.  cyanocobalamin 1,000 mcg tablet Take 1,000 mcg by mouth daily.  famotidine (PEPCID) 10 mg tablet Take 10 mg by mouth nightly.  ferrous sulfate 325 mg (65 mg iron) tablet Take 325 mg by mouth Daily (before breakfast).  furosemide (Lasix) 20 mg tablet Take 20 mg by mouth every other day.  guaiFENesin (ORGANIDIN) 200 mg tablet Take 200 mg by mouth every four (4) hours as needed for Congestion.  hydrALAZINE (APRESOLINE) 25 mg tablet Take 25 mg by mouth three (3) times daily.  isosorbide dinitrate (ISORDIL) 20 mg tablet Take 20 mg by mouth three (3) times daily.  loratadine (CLARITIN) 10 mg tablet Take 10 mg by mouth daily as needed for Allergies.  metoprolol tartrate (LOPRESSOR) 50 mg tablet Take 50 mg by mouth two (2) times a day.  tamsulosin (Flomax) 0.4 mg capsule Take 0.4 mg by mouth nightly. LAB AND IMAGING FINDINGS:     Lab Results   Component Value Date/Time    WBC 6.8 09/14/2021 02:39 AM    HGB 8.2 (L) 09/14/2021 02:39 AM    PLATELET 66 (L) 90/56/5403 02:39 AM     Lab Results   Component Value Date/Time    Sodium 151 (H) 09/14/2021 02:39 AM    Potassium 4.8 09/14/2021 02:39 AM    Chloride 120 (H) 09/14/2021 02:39 AM    CO2 18 (L) 09/14/2021 02:39 AM     (H) 09/14/2021 02:39 AM    Creatinine 4.27 (H) 09/14/2021 02:39 AM    Calcium 6.1 (LL) 09/14/2021 02:39 AM    Magnesium 2.4 09/13/2021 05:59 AM    Phosphorus 8.5 (H) 09/13/2021 05:59 AM      Lab Results   Component Value Date/Time    Alk.  phosphatase 954 (H) 09/14/2021 02:39 AM    Protein, total 5.9 (L) 09/14/2021 02:39 AM    Albumin 1.8 (L) 09/14/2021 02:39 AM    Globulin 4.1 (H) 09/14/2021 02:39 AM     No results found for: INR, PTMR, PTP, PT1, PT2, APTT, INREXT, INREXT   No results found for: IRON, FE, TIBC, IBCT, PSAT, FERR   No results found for: PH, PCO2, PO2  No components found for: [place] Lab Results   Component Value Date/Time     (H) 09/13/2021 05:59 AM                Total time: 25 min  Counseling / coordination time, spent as noted above: 15 min  > 50% counseling / coordination?: yes    Prolonged service was provided for  []30 min   []75 min in face to face time in the presence of the patient, spent as noted above. Time Start:   Time End:   Note: this can only be billed with 04182 (initial) or 82450 (follow up). If multiple start / stop times, list each separately.

## 2021-09-15 NOTE — ED NOTES
TRANSFER - OUT REPORT:    Verbal report given to Robina(name) on Catie Campos  being transferred to [address] [place] [address](unit) for routine progression of care       Report consisted of patients Situation, Background, Assessment and   Recommendations(SBAR). Information from the following report(s) SBAR, Kardex, ED Summary and MAR was reviewed with the receiving nurse. Lines:   Intel Lumen 09/13/21 Right Internal jugular (Active)   Central Line Being Utilized Yes 09/13/21 1538   Site Assessment Clean, dry, & intact 09/13/21 1538   Affected Extremity/Extremities Pulses palpable 09/13/21 1538   Date of Last Dressing Change 09/13/21 09/13/21 1538   Dressing Status Clean, dry, & intact 09/13/21 1538        Opportunity for questions and clarification was provided.       Patient transported with:   Monitor  Registered Nurse

## 2021-09-15 NOTE — PROGRESS NOTES
Hospitalist Progress Note  Mati Mcgill MD  Answering service: [phone] from in house phone      Date of Service:  9/15/2021  NAME:  Gaylene Angelucci  :  1945  MRN:  [id number]    Admission Summary:   75M was on hospice, not eating, cachectic a/w AMS  Interval history / Subjective:   Patient seen and examined at bedside, looks moribund, starting to make upper airway noise, likely building up secretions. Appreciate palliative care team- reaching out to family. Family agreeing on stopping active treatment and switching to comfort, they would like to come round 1PM, plan to stop treatment/drip when they arrive here. Assessment & Plan:     Acute metabolic encephalopathy with underlying dementia  Severe symptomatic hypoglycemia  Diarrhea  Hypernatremia  ANILA, unknown baseline - US: no obstruction. Deranged LFTs with normal bilirubin- monitor LFTs  Complete opacification of the left hemithorax. Recent diagnosis of UTI  Thrombocytopenia  #. Hypoglycemia: D10 infusion. Monitor sugars. -- Blood sugar and blood pressure improved. Continue D5, increased rate. Monitor blood sugar hourly for the next few hours then can change to every 2every 3 hourly  -- Maintain Annel hugger for hypothermia. Closely monitor electrolytes. -- Keep n.p.o- Aspiration and fall precautions- Obtain UA, blood culture, COVID -ve.  -- acute hepatitis panel pending. abdominal ultrasound: no acute findings. -- Nephrology following. ICU evaluated. Code status: DNR  DVT prophylaxis: SCD  Care Plan discussed with: Patient/Family and Nurse  Disposition: TBD 1-2days     Hospital Problems  Never Reviewed        Codes Class Noted POA    Hypothermia ICD-10-CM: T68. XXXA  ICD-9-CM: 991.6  2021 Unknown        Hypoglycemia ICD-10-CM: E16.2  ICD-9-CM: 251.2  2021 Unknown        Elevated liver enzymes ICD-10-CM: R74.8  ICD-9-CM: 790.5  2021 Unknown        Acute metabolic encephalopathy NHY-94-CF: G93.41  ICD-9-CM: 348.31  9/13/2021 Unknown            Review of Systems:   Pertinent items are mentioned in interval history. Vital Signs:    Last 24hrs VS reviewed since prior progress note. Most recent are:  Visit Vitals  BP (!) 140/74   Pulse 71   Temp 98.6 °F (37 °C)   Resp 15   SpO2 98%         Intake/Output Summary (Last 24 hours) at 9/15/2021 1126  Last data filed at 9/15/2021 0229  Gross per 24 hour   Intake 150 ml   Output 800 ml   Net -650 ml        Physical Examination:   Evaluated face to face and examined 09/15/21    General:  Alert, confused, No acute distress. Cachectic, very slim/wasted elderly BM  Card:  S1, S2, No murmurs, good peripheral perfusion  Resp:  No accessory muscle use, Good AE, no wheezes. no crepitations  Abd:  Soft, non-tender, non-distended, BS+  Extremities:  No cyanosis or clubbing, no edema  Neuro:  confused, not much interactive. Cachectic wasted. No clear focal neurodeficits  Psych:  unable to assess. Data Review:    Review and/or order of clinical lab test  Review and/or order of tests in the radiology section of CPT  Review and/or order of tests in the medicine section of CPT  Labs:     Recent Labs     09/14/21 0239 09/13/21  0559   WBC 6.8 5.4   HGB 8.2* 8.6*   HCT 25.2* 26.5*   PLT 66* 66*     Recent Labs     09/14/21 0239 09/13/21  1618 09/13/21  0559   * 157* 153*   K 4.8 4.8 4.8   * 127* 124*   CO2 18* 20* 20*   * 133* 122*   CREA 4.27* 4.23* 4.01*   GLU 75 133* 13*   CA 6.1* 6.8* 6.9*   MG  --   --  2.4   PHOS  --   --  8.5*     Recent Labs     09/14/21 0239 09/13/21  0942 09/13/21  0559   * 928* 755*   * 1,303* 1,186*   TBILI 0.2 0.4 0.5   TP 5.9* 6.0* 6.3*   ALB 1.8* 2.0* 1.9*   GLOB 4.1* 4.0 4.4*     No results for input(s): INR, PTP, APTT, INREXT, INREXT in the last 72 hours. No results for input(s): FE, TIBC, PSAT, FERR in the last 72 hours.    No results found for: FOL, RBCF   No results for input(s): PH, PCO2, PO2 in the last 72 hours.   Recent Labs     09/13/21  0559   *   TROIQ <0.05     No results found for: CHOL, CHOLX, CHLST, CHOLV, HDL, HDLP, LDL, LDLC, DLDLP, TGLX, TRIGL, TRIGP, CHHD, CHHDX  Lab Results   Component Value Date/Time    Glucose (POC) 78 09/15/2021 07:49 AM    Glucose (POC) 85 09/15/2021 03:49 AM    Glucose (POC) 84 09/14/2021 11:40 PM    Glucose (POC) 89 09/14/2021 08:49 PM    Glucose (POC) 99 09/14/2021 06:07 PM     Lab Results   Component Value Date/Time    Color YELLOW/STRAW 09/13/2021 11:37 AM    Appearance CLOUDY (A) 09/13/2021 11:37 AM    Specific gravity 1.015 09/13/2021 11:37 AM    pH (UA) 7.0 09/13/2021 11:37 AM    Protein 300 (A) 09/13/2021 11:37 AM    Glucose Negative 09/13/2021 11:37 AM    Ketone Negative 09/13/2021 11:37 AM    Bilirubin Negative 09/13/2021 11:37 AM    Urobilinogen 0.2 09/13/2021 11:37 AM    Nitrites Positive (A) 09/13/2021 11:37 AM    Leukocyte Esterase MODERATE (A) 09/13/2021 11:37 AM    Epithelial cells FEW 09/13/2021 11:37 AM    Bacteria 4+ (A) 09/13/2021 11:37 AM    WBC 10-20 09/13/2021 11:37 AM    RBC 0-5 09/13/2021 11:37 AM     Medications Reviewed:     Current Facility-Administered Medications   Medication Dose Route Frequency    dextrose 5% infusion  100 mL/hr IntraVENous CONTINUOUS    sodium chloride (NS) flush 5-40 mL  5-40 mL IntraVENous Q8H    sodium chloride (NS) flush 5-40 mL  5-40 mL IntraVENous PRN    acetaminophen (TYLENOL) tablet 650 mg  650 mg Oral Q6H PRN    Or    acetaminophen (TYLENOL) suppository 650 mg  650 mg Rectal Q6H PRN    polyethylene glycol (MIRALAX) packet 17 g  17 g Oral DAILY PRN    ondansetron (ZOFRAN ODT) tablet 4 mg  4 mg Oral Q8H PRN    Or    ondansetron (ZOFRAN) injection 4 mg  4 mg IntraVENous Q6H PRN    cefTRIAXone (ROCEPHIN) 1 g in 0.9% sodium chloride (MBP/ADV) 50 mL MBP  1 g IntraVENous Q24H    glucose chewable tablet 16 g  4 Tablet Oral PRN    dextrose (D50W) injection syrg 12.5-25 g 12.5-25 g IntraVENous PRN    glucagon (GLUCAGEN) injection 1 mg  1 mg IntraMUSCular PRN     Current Outpatient Medications   Medication Sig    amLODIPine (NORVASC) 10 mg tablet Take 10 mg by mouth daily.  aspirin 81 mg chewable tablet Take 81 mg by mouth daily.  atorvastatin (LIPITOR) 80 mg tablet Take 80 mg by mouth daily.  cetirizine (ZYRTEC) 5 mg tablet Take 5 mg by mouth daily as needed for Allergies.  cyanocobalamin 1,000 mcg tablet Take 1,000 mcg by mouth daily.  famotidine (PEPCID) 10 mg tablet Take 10 mg by mouth nightly.  ferrous sulfate 325 mg (65 mg iron) tablet Take 325 mg by mouth Daily (before breakfast).  furosemide (Lasix) 20 mg tablet Take 20 mg by mouth every other day.  guaiFENesin (ORGANIDIN) 200 mg tablet Take 200 mg by mouth every four (4) hours as needed for Congestion.  hydrALAZINE (APRESOLINE) 25 mg tablet Take 25 mg by mouth three (3) times daily.  isosorbide dinitrate (ISORDIL) 20 mg tablet Take 20 mg by mouth three (3) times daily.  loratadine (CLARITIN) 10 mg tablet Take 10 mg by mouth daily as needed for Allergies.  metoprolol tartrate (LOPRESSOR) 50 mg tablet Take 50 mg by mouth two (2) times a day.     tamsulosin (Flomax) 0.4 mg capsule Take 0.4 mg by mouth nightly.   ______________________________________________________________________  EXPECTED LENGTH OF STAY: - - -  ACTUAL LENGTH OF STAY:          2               Catie Gonzalez MD

## 2021-09-16 NOTE — ED NOTES
Pt family states that they do not want IV medicines stopped for pt blood glucose and do not want pt placed on any kind of hospice care at this time.

## 2021-09-16 NOTE — PROGRESS NOTES
[facility]   [address] [facility], [address] [place] [address], [facility]  Phone: [phone]   [phone]       Nephrology Progress Note  Disha Srivastava     1945     [id number]  Date of Admission : 9/13/2021 09/16/21    CC: Follow up for ANILA       Assessment and Plan   ANILA :  - Baseline creatinine unknown. was on Bactrim prior to admission.   - Looks terminally ill and  very dry   - continue w/ Hypotonic fluids :  D5W   - Renal ultrasound: No obstruction  - Not a candidate for dialysis due to severe debilitated condition   - FOLLOW BMP     Hyponatremia:  -Secondary to dehydration  -Agree with hypotonic fluids      Opacification of left lung  Hypocalcemia  Hx of CVA  Encephalopathy  Dementia  -MX per primary team     Hypoglycemia  Hypothermia  -Likely 2/2 infection     Recent UTI       Interval History:  Seen and examined,. Remains obtunded. No labs done today . Review of Systems: Review of systems not obtained due to patient factors.     Current Medications:   Current Facility-Administered Medications   Medication Dose Route Frequency    dextrose 5% infusion  100 mL/hr IntraVENous CONTINUOUS    piperacillin-tazobactam (ZOSYN) 3.375 g in 0.9% sodium chloride (MBP/ADV) 100 mL MBP  3.375 g IntraVENous Q12H    sodium chloride (NS) flush 5-40 mL  5-40 mL IntraVENous Q8H    sodium chloride (NS) flush 5-40 mL  5-40 mL IntraVENous PRN    acetaminophen (TYLENOL) tablet 650 mg  650 mg Oral Q6H PRN    Or    acetaminophen (TYLENOL) suppository 650 mg  650 mg Rectal Q6H PRN    polyethylene glycol (MIRALAX) packet 17 g  17 g Oral DAILY PRN    ondansetron (ZOFRAN ODT) tablet 4 mg  4 mg Oral Q8H PRN    Or    ondansetron (ZOFRAN) injection 4 mg  4 mg IntraVENous Q6H PRN    glucose chewable tablet 16 g  4 Tablet Oral PRN    dextrose (D50W) injection syrg 12.5-25 g  12.5-25 g IntraVENous PRN    glucagon (GLUCAGEN) injection 1 mg  1 mg IntraMUSCular PRN      No Known Allergies    Objective:  Vitals: Vitals:    09/16/21 0615 09/16/21 0800 09/16/21 1011 09/16/21 1026   BP:       Pulse: 61 61  92   Resp:  13     Temp:  97.8 °F (36.6 °C)     SpO2:   98%      Intake and Output:  No intake/output data recorded. 09/14 1901 - 09/16 0700  In: 2305 [I.V.:2305]  Out: 1400 [Urine:1400]    Physical Examination:  General: Cachectic and terminal looking  Neck: Supple,no mass palpable  Lungs : Diminished bilaterally  CVS: RRR, s1,s2  Extremities: no Edema  Neurologic: Obtunded, bilateral contractures  Psych: Unable to assess    []    High complexity decision making was performed  []    Patient is at high-risk of decompensation with multiple organ involvement    Lab Data Personally Reviewed: I have reviewed all the pertinent labs, microbiology data and radiology studies during assessment.     Recent Labs     09/15/21  1156 09/14/21  0239 09/13/21  1618   * 151* 157*   K 4.0 4.8 4.8   * 120* 127*   CO2 17* 18* 20*   * 75 133*   * 132* 133*   CREA 4.45* 4.27* 4.23*   CA 6.2* 6.1* 6.8*   ALB 1.5* 1.8*  --    * 569*  --      Recent Labs     09/14/21  0239   WBC 6.8   HGB 8.2*   HCT 25.2*   PLT 66*     No results found for: SDES  Lab Results   Component Value Date/Time    Culture result: KLEBSIELLA PNEUMONIAE (A) 09/13/2021 11:37 AM    Culture result: PROTEUS MIRABILIS (A) 09/13/2021 11:37 AM    Culture result: NO GROWTH 3 DAYS 09/13/2021 09:42 AM     Recent Results (from the past 24 hour(s))   GLUCOSE, POC    Collection Time: 09/15/21 11:37 AM   Result Value Ref Range    Glucose (POC) 103 65 - 117 mg/dL    Performed by Micah clarke RN    METABOLIC PANEL, COMPREHENSIVE    Collection Time: 09/15/21 11:56 AM   Result Value Ref Range    Sodium 148 (H) 136 - 145 mmol/L    Potassium 4.0 3.5 - 5.1 mmol/L    Chloride 118 (H) 97 - 108 mmol/L    CO2 17 (L) 21 - 32 mmol/L    Anion gap 13 5 - 15 mmol/L    Glucose 134 (H) 65 - 100 mg/dL     (H) 6 - 20 MG/DL    Creatinine 4.45 (H) 0.70 - 1.30 MG/DL    BUN/Creatinine ratio 30 (H) 12 - 20      GFR est AA 16 (L) >60 ml/min/1.73m2    GFR est non-AA 13 (L) >60 ml/min/1.73m2    Calcium 6.2 (LL) 8.5 - 10.1 MG/DL    Bilirubin, total 0.2 0.2 - 1.0 MG/DL    ALT (SGPT) 299 (H) 12 - 78 U/L    AST (SGOT) 553 (H) 15 - 37 U/L    Alk. phosphatase 740 (H) 45 - 117 U/L    Protein, total 5.3 (L) 6.4 - 8.2 g/dL    Albumin 1.5 (L) 3.5 - 5.0 g/dL    Globulin 3.8 2.0 - 4.0 g/dL    A-G Ratio 0.4 (L) 1.1 - 2.2     GLUCOSE, POC    Collection Time: 09/15/21  3:08 PM   Result Value Ref Range    Glucose (POC) 122 (H) 65 - 117 mg/dL    Performed by Leisa clarke RN    GLUCOSE, POC    Collection Time: 09/15/21  7:22 PM   Result Value Ref Range    Glucose (POC) 108 65 - 117 mg/dL    Performed by Leisa clarke RN    GLUCOSE, POC    Collection Time: 09/15/21 11:00 PM   Result Value Ref Range    Glucose (POC) 116 65 - 117 mg/dL    Performed by Leisa clarke RN    GLUCOSE, POC    Collection Time: 09/16/21  3:44 AM   Result Value Ref Range    Glucose (POC) 112 65 - 117 mg/dL    Performed by Ivan Cotton            Total time spent with patient:  xxx   min. Care Plan discussed with:  Patient     Family      RN      Consulting Physician [facility],         I have reviewed the flowsheets. Chart and Pertinent Notes have been reviewed. No change in PMH ,family and social history from Consult note.       Tammie Fox MD

## 2021-09-16 NOTE — PROGRESS NOTES
NORMAN: d/c plan to be determined pending clinical progression & family decision; Possible plan for home with hospice vs GIP pending stability; Pt currently on 3L/NC 02; BLS Transport; Primary contact is daughter, Eduar Burgess [phone]    RUR: 14%    0900-CM noted hospice consult order from Attending. CM sent perfect serve message to Attending as well as Palliative NP. As per report from Palliative NP, pt's daughter does not want hospice at this time, she advised NP that her father wants to [facility]". CM notes that record reflects that pt is currently unresponsive & on a bear hugger. CM discussed pt case with nurse and informed of family decision at this time. CM to follow.   Roxane Daley RN BSN CCM

## 2021-09-16 NOTE — ED NOTES
Pt family member, Chrissie Quinones, called and wants to speak to someone in management about how mother in law was treated by Maribel Casillas" when visiting pt. Her numbers are cell: [phone] or work: [phone].

## 2021-09-16 NOTE — ED NOTES
TRANSFER - OUT REPORT:    Verbal report given to IMCU RN(name) on Kathleen Racer  being transferred to [facility]) for routine progression of care       Report consisted of patients Situation, Background, Assessment and   Recommendations(SBAR). Information from the following report(s) SBAR, ED Summary, [facility] and Recent Results was reviewed with the receiving nurse. Lines:   Intel Lumen 09/13/21 Right Internal jugular (Active)   Central Line Being Utilized Yes 09/13/21 1538   Site Assessment Clean, dry, & intact 09/13/21 1538   Affected Extremity/Extremities Pulses palpable 09/13/21 1538   Date of Last Dressing Change 09/13/21 09/13/21 1538   Dressing Status Clean, dry, & intact 09/13/21 1538        Opportunity for questions and clarification was provided.       Patient transported with:   Registered Nurse

## 2021-09-16 NOTE — ED NOTES
Bedside and Verbal shift change report given to Nelia López (oncoming nurse) by Victor Manuel Garcia (offgoing nurse). Report included the following information SBAR, Kardex, ED Summary and MAR.

## 2021-09-16 NOTE — PROGRESS NOTES
Palliative Medicine Consult  Markus: [phone] ([phone])    Patient Name: Ramos Weaver  YOB: 1945    Date of Initial Consult: 9/13/21  Reason for Consult: Care decisions  Requesting Provider: Dr. Mala Olmedo  Primary Care Physician: Heydi Herrmann MD     SUMMARY:   Ramos Weaver is a 67 y.o. male with a past history of hypertension, diabetes mellitus, dysphagia, aspiration, failure to thrive, right sided weakness, right eye blindness, and dementia, who was admitted on 9/13/2021from home with a diagnosis of hypoglycemia, hypothermia, hypotension, UTI, ANILA, and abnormal LFTs. His daughter reported that he had recently been diagnosed with an UTI and started antibiotics, which caused diarrhea. He presented with complaints of altered mental status. He was found to be hypoglycemic, hypotensive, with ANILA, and abnormal LFTs. He was also found to have a complete opacification on the left per CXR. He was placed under a warming blanket and started on D5W IV fluids. Current medical issues leading to Palliative Medicine involvement include: multiple acute medical issues, failure to thrive. Social: He has a daughter, Go, and a son, Christi Kelly. PALLIATIVE DIAGNOSES:   1. Palliative care encounter  2. Goals of care  3. End of life care  4. Frequent hospitalizations  5. Altered mental status  6. Hypoglycemia  7. UTI  8. Dehydration, ANILA  9. Poor PO intake prior to admit  10. Diarrhea  11. Frailty  12. Failure to thrive       PLAN:   Chart reviewed and case discussed with Dr. Edith Turner. The patient's daughter decided against comfort measures last night. I called and spoke with daughter Go. She says that when she visited the patient last night, he looked stronger to her and told her that he wanted to keep fighting. She does not want to stop the dextrose IV fluids and wants to keep everything going for a few more days to see if he improves.  I explained that his kidney function continues to worsen and he is not a candidate for dialysis. I also let her know that I am very concerned that he is suffering and that I do not forsee him improving over the next few days. She was firm in her decision and said that she does not want to transition him to comfort care at this time and wants to continue the current care. Dr. Halle Islas and case management updated. Please call the palliative medicine team if the family decides that they want to discuss goals of care further. Patient seen at the bedside this morning. He was unresponsive to verbal or tactile stimulation during my encounter with him. Communicated plan of care with: Palliative IDT, [facility] Team     GOALS OF CARE / TREATMENT PREFERENCES:     GOALS OF CARE:  Patient/Health Care Proxy Stated Goals: Prolong life    TREATMENT PREFERENCES:   Code Status: DNR    Advance Care Planning:  [x] [facility] Interdisciplinary Team has updated the ACP Navigator with Health Care Decision Maker and Patient Capacity      Primary Decision Maker: Amber Lazaro" - Daughter - [phone]    Primary Decision Maker: Aydin Dotson - Son  No flowsheet data found. Medical Interventions: Limited additional interventions     Other Instructions: Other:    As far as possible, the palliative care team has discussed with patient / health care proxy about goals of care / treatment preferences for patient.      HISTORY:     History obtained from: chart    CHIEF COMPLAINT: Altered mental status    HPI/SUBJECTIVE:    The patient is:   [] Verbal and participatory  [x] Non-participatory due to: altered mental status, clinical condition    Clinical Pain Assessment (nonverbal scale for severity on nonverbal patients):   Clinical Pain Assessment  Severity: 0     Activity (Movement): Lying quietly, normal position    Duration: for how long has pt been experiencing pain (e.g., 2 days, 1 month, years)  Frequency: how often pain is an issue (e.g., several times per day, once every few days, constant)     FUNCTIONAL ASSESSMENT:     Palliative Performance Scale (PPS):  PPS: 20       PSYCHOSOCIAL/SPIRITUAL SCREENING:     Palliative IDT has assessed this patient for cultural preferences / practices and a referral made as appropriate to needs (Cultural Services, Patient Advocacy, Ethics, etc.)    Any spiritual / Sikhism concerns:  [] Yes /  [x] No    Caregiver Burnout:  [] Yes /  [x] No /  [] No Caregiver Present      Anticipatory grief assessment:   [x] Normal  / [] Maladaptive       ESAS Anxiety:      ESAS Depression:          REVIEW OF SYSTEMS:     Positive and pertinent negative findings in ROS are noted above in HPI. The following systems were [] reviewed / [x] unable to be reviewed as noted in HPI  Other findings are noted below. Systems: constitutional, ears/nose/mouth/throat, respiratory, gastrointestinal, genitourinary, musculoskeletal, integumentary, neurologic, psychiatric, endocrine. Positive findings noted below. Modified ESAS Completed by: provider   Fatigue: 10 Drowsiness: 10     Pain: 0           Dyspnea: 1           Stool Occurrence(s): 2        PHYSICAL EXAM:     From RN flowsheet:  Wt Readings from Last 3 Encounters:   No data found for Wt     Blood pressure 130/67, pulse 64, temperature 97.3 °F (36.3 °C), resp. rate 15, SpO2 96 %.     Pain Scale 1: Adult Nonverbal Pain Scale  Pain Intensity 1: 0                 Last bowel movement, if known:     Constitutional: sleeping quietly, frail, elderly, cachectic, poorly responsive  Eyes: pupils equal, anicteric  ENMT: no nasal discharge, dry mucous membranes  Cardiovascular: regular rhythm  Respiratory: breathing not labored, symmetric  Musculoskeletal: no deformity, no tenderness to palpation  Skin: warm, dry  Neurologic: not following commands, moving all extremities  Psychiatric: unable to evaluate     HISTORY:     Active Problems:    Hypothermia (9/13/2021)      Hypoglycemia (9/13/2021)      Elevated liver enzymes (9/13/2021)      Acute metabolic encephalopathy (6/67/8616)      No past medical history on file. No past surgical history on file. No family history on file. History reviewed, no pertinent family history. Social History     Tobacco Use    Smoking status: Not on file   Substance Use Topics    Alcohol use: Not on file     No Known Allergies   Current Facility-Administered Medications   Medication Dose Route Frequency    dextrose 5% infusion  100 mL/hr IntraVENous CONTINUOUS    piperacillin-tazobactam (ZOSYN) 3.375 g in 0.9% sodium chloride (MBP/ADV) 100 mL MBP  3.375 g IntraVENous Q12H    sodium chloride (NS) flush 5-40 mL  5-40 mL IntraVENous Q8H    sodium chloride (NS) flush 5-40 mL  5-40 mL IntraVENous PRN    acetaminophen (TYLENOL) tablet 650 mg  650 mg Oral Q6H PRN    Or    acetaminophen (TYLENOL) suppository 650 mg  650 mg Rectal Q6H PRN    polyethylene glycol (MIRALAX) packet 17 g  17 g Oral DAILY PRN    ondansetron (ZOFRAN ODT) tablet 4 mg  4 mg Oral Q8H PRN    Or    ondansetron (ZOFRAN) injection 4 mg  4 mg IntraVENous Q6H PRN    glucose chewable tablet 16 g  4 Tablet Oral PRN    dextrose (D50W) injection syrg 12.5-25 g  12.5-25 g IntraVENous PRN    glucagon (GLUCAGEN) injection 1 mg  1 mg IntraMUSCular PRN          LAB AND IMAGING FINDINGS:     Lab Results   Component Value Date/Time    WBC 10.2 09/16/2021 10:55 AM    HGB 8.6 (L) 09/16/2021 10:55 AM    PLATELET 50 (L) 76/89/7312 10:55 AM     Lab Results   Component Value Date/Time    Sodium 148 (H) 09/15/2021 11:56 AM    Potassium 4.0 09/15/2021 11:56 AM    Chloride 118 (H) 09/15/2021 11:56 AM    CO2 17 (L) 09/15/2021 11:56 AM     (H) 09/15/2021 11:56 AM    Creatinine 4.45 (H) 09/15/2021 11:56 AM    Calcium 6.2 (LL) 09/15/2021 11:56 AM    Magnesium 2.4 09/13/2021 05:59 AM    Phosphorus 8.5 (H) 09/13/2021 05:59 AM      Lab Results   Component Value Date/Time    Alk.  phosphatase 740 (H) 09/15/2021 11:56 AM    Protein, total 5.3 (L) 09/15/2021 11:56 AM    Albumin 1.5 (L) 09/15/2021 11:56 AM    Globulin 3.8 09/15/2021 11:56 AM     No results found for: INR, PTMR, PTP, PT1, PT2, APTT, INREXT, INREXT   No results found for: IRON, FE, TIBC, IBCT, PSAT, FERR   No results found for: PH, PCO2, PO2  No components found for: [place]   Lab Results   Component Value Date/Time     (H) 09/13/2021 05:59 AM                Total time: 25 min  Counseling / coordination time, spent as noted above: 15 min  > 50% counseling / coordination?: yes    Prolonged service was provided for  []30 min   []75 min in face to face time in the presence of the patient, spent as noted above. Time Start:   Time End:   Note: this can only be billed with 66902 (initial) or 47236 (follow up). If multiple start / stop times, list each separately.

## 2021-09-17 NOTE — CONSULTS
Comprehensive Nutrition Assessment    Type and Reason for Visit: Initial, Positive nutrition screen, Consult      Nutrition Recommendations/Plan: If TPN - recommend 5%AA, 10% dex @ 50 mL/hr for next 72°    Add 100 mg thiamine, 1 mg folic acid daily    D/c additional D5    Monitor lytes. Suspect K+ may be more of an issue than Phos at least initially     If lytes look good after 72°, consider adjusting to 5%AA, 12% dex @ 63 mL/hr    Then goal - 5%AA, 15% dex @ 63 mL/hr + 250 mL 20% lipids 3x/week    If pt improves clinically and DHT back in discussion - recommend Osmolite 1.2 to goal of 50 mL/hr with 100 mL h2O flushes q 4 hours      Nutrition Assessment:     PMHx of HTN, DM, dysphagia, aspiration, failure to thrive, right sided weakness, right eye blindness, and dementia, who was admitted on 9/13/2021 from home with a diagnosis of hypoglycemia, hypothermia, hypotension, UTI, ANILA, and abnormal LFTs. Noted pt is terminally ill. Initially palliative and hospice consulted. However, daughter is not interested in comfort measures at this time. ANILA - Pt is obtunded with bear hugger in place. Cachectic. Encephalopathic with underlying dementia. Unable to take PO d/t mental status and without any means of nutrition at this time. No family present. Currently on D5 @ 50 mL/hr providing 60 gm dex (204 kcal). Noted DHT discussion, but now consulted for TPN recommendations. Pt should be consider at extremely high risk of refeeding. However, Phos above NL d/t ANILA. Not an HD candidate. K+ low normal.  Would be more concerned with K+ dropping BNL. Has no thiamine/ folic acid. Discussed with Dr. Kaushik Hannah why TPN over [address]. States he is concerned how pt would evolve clinically and also less invasive for pt since already has central line. If TPN - recommend no more than 5%AA, 10% dex @ 50 mL/hr over next 3 days. Add thiamine, folic acid.   If does well over weekend, may consider DHT placement and could avoid lipids. If not, consider adding lipids at that time as starting to increase TPN to goal.    Likely need to keep Phos out of TPN, but monitor levels closely. Will check in early next week. Above TPN provides 1200 mL, 60 gm pro, 120 gm dex, and total 648 kcal (~14 kcal/kg) - this is an appropriate level for refeeding concerns. If lytes look good after 72 hours, would titrate to goal to 5%AA, 15% dex @ 63 mL/hr + 250 mL 20% lipids 3x/week to provide 6063-5916 mL, 1287 kcal, 76 gm pro, 227 gm dex. Suggest going to 12% dex first, then 15% the following day.   However, if pt is more stable - consider DHT with TF of Osmolite 1.2 to goal of 50 mL/hr with 100 mL H2O flushes q 4 hours to provide 1100 mL, 1320 kcal, 61 gm pro, 891+833=2277 mL free H2O    Malnutrition Assessment:  Malnutrition Status:  Severe malnutrition    Context:  Chronic illness     Findings of the 6 clinical characteristics of malnutrition:   Energy Intake:  Unable to assess  Weight Loss:  Unable to assess     Body Fat Loss:  7 - Severe body fat loss, Fat overlying ribs, Triceps, Orbital, Buccal region   Muscle Mass Loss:  7 - Severe muscle mass loss, Clavicles (pectoralis &deltoids), Hand (interosseous), Thigh (quadriceps), Temples (temporalis), Calf (gastrocnemius), Scapula (trapezius)  Fluid Accumulation:  No significant fluid accumulation,     Strength:  Not performed       Nutritionally Significant Medications:   Calcium gluconate, D5 @ 50 mL/hr, Zosyn, sodium bicarb  PRN: Miralax      Estimated Nutrition Needs:   Energy (kcal/day): 4227-9664 (25-30 kcal/kg for now, up to 35+ kcal/kg when more stable)   Protein (gm/day): 55-70 (1.2-1.5 gm/kg, 1.5+ gm/kg when more stable)   Fluid (mL/day): 1500 (older adult)   Wt used: Current (45.5 kg)        Nutrition Related Findings:   Edema: none  Last BM: 9/17      Wounds:    Multiple, Stage II, Pressure injury (to hip and sacrum per nursing)       Current Nutrition Therapies:  Diet: NPO  PN Order: pending. .. Anthropometric Measures:  · Height:  5' 8\" (172.7 cm)  · Current Body Wt:  45.5 kg (100 lb 4.8 oz)   · Admission Body Wt:  96 lb 11.2 oz    · Ideal Body Wt:  154 lbs:  65.1 %   · BMI Category:  Underweight (BMI less than 22) age over 72       Wt Readings from Last 20 Encounters:   09/17/21 45.5 kg (100 lb 4.8 oz) - bed   09/16/21 43.9 kg (96 lb 11.2 oz) - admission       Nutrition Diagnosis:   · Inadequate oral intake related to cognitive or neurological impairment as evidenced by NPO or clear liquid status due to medical condition    · Severe malnutrition related to cognitive or neurological impairment, inadequate protein-energy intake, catabolic illness as evidenced by BMI, severe loss of subcutaneous fat, severe muscle loss      Nutrition Interventions:   Food and/or Nutrient Delivery: Continue NPO, Start parenteral nutrition, Mineral supplement, Vitamin supplement  Nutrition Education and Counseling: No recommendations at this time  Coordination of Nutrition Care: Continue to monitor while inpatient    Goals:  TPN to meet no more than 60-70% of needs over next 72 hours        Nutrition Monitoring and Evaluation:   Behavioral-Environmental Outcomes: None identified  Food/Nutrient Intake Outcomes: Parenteral nutrition intake/tolerance, Vitamin/mineral intake, IVF intake, Diet advancement/tolerance  Physical Signs/Symptoms Outcomes: Biochemical data, Hemodynamic status, Nutrition focused physical findings, Weight, Skin    Discharge Planning:     Too soon to determine     Recent Labs     09/17/21 0442 09/17/21 0400 09/16/21  1055 09/15/21  1156   * 152* 88 134*   * 126* 128* 134*   CREA 4.42* 4.41* 4.39* 4.45*   * 144 144 148*   K 3.6 3.6 3.7 4.0   * 114* 116* 118*   CO2 18* 18* 15* 17*   CA 6.2* 6.2* 6.1* 6.2*   PHOS 7.3* 7.2* 6.8*  --    MG 2.0  --  2.1  --        Recent Labs     09/17/21 0442 09/17/21  0400 09/16/21  1055   * 177* 215*   * 582* 626*   TBILI 0.2 0.2 0.2   TP 5.4* 5.4* 5.4*   ALB 1.4*  1.4* 1.4* 1.5*   GLOB 4.0 4.0 3.9       Recent Labs     09/16/21  1055   LAC 1.0       Recent Labs     09/17/21  0442 09/16/21  1055   WBC 13.3* 10.2   HGB 8.5* 8.6*   HCT 26.0* 26.2*   PLT 46* 50*       No results for input(s): PREALB in the last 72 hours. No results for input(s): TRIGL in the last 72 hours.     Recent Labs     09/17/21  1113 09/17/21  0757 09/16/21  0344 09/15/21  2300 09/15/21  1922 09/15/21  1508 09/15/21  1137 09/15/21  0749 09/15/21  0349 09/14/21  2340 09/14/21  2049 09/14/21  1807   GLUCPOC 81 91 112 116 108 122* 103 78 85 84 89 99       No results found for: HBA1C, OCH9WTXO, FKM6LYYW, AEP2BDYK    Iron Profile  No results found for: IRON, TIBC, PSAT    No results found for: FERR    B Vitamins  No results found for: FOL, B12LT, VB6LT, VIB1LT    No results found for: MMALT    Zinc  No results found for: ZINCLT    Copper  No results found for: COSLT    Selenium  No results found for: SELNLT    Fat Soluble Vitamins    No results found for: VITALT, VITD3, VITE1T, VITEG, VIK1LT      Albert Toscano RD  Available via [address] [place] [address]

## 2021-09-17 NOTE — PROGRESS NOTES
Bedside and Verbal shift change report given to [address], RN (oncoming nurse) by Elida Hernandez RN (offgoing nurse). Report included the following information SBAR, Intake/Output, MAR, Recent Results and Cardiac Rhythm NSR.

## 2021-09-17 NOTE — PROGRESS NOTES
Bedside and Verbal shift change report given to Alexa (oncoming nurse) by Sol Gomez (offgoing nurse). Report included the following information SBAR, Kardex, MAR and Cardiac Rhythm NSR.

## 2021-09-17 NOTE — PROGRESS NOTES
Problem: Risk for Spread of Infection  Goal: Prevent transmission of infectious organism to others  Description: Prevent the transmission of infectious organisms to other patients, staff members, and visitors. Outcome: Progressing Towards Goal     Problem: Patient Education:  Go to Education Activity  Goal: Patient/Family Education  Outcome: Progressing Towards Goal     Problem: Pressure Injury - Risk of  Goal: *Prevention of pressure injury  Description: Document Ramírez Scale and appropriate interventions in the flowsheet. Outcome: Progressing Towards Goal  Note: Pressure Injury Interventions:  Sensory Interventions: Float heels, Pad between skin to skin, Pressure redistribution bed/mattress (bed type)    Moisture Interventions: Absorbent underpads, Check for incontinence Q2 hours and as needed    Activity Interventions: Pressure redistribution bed/mattress(bed type)    Mobility Interventions: HOB 30 degrees or less, Float heels    Nutrition Interventions: Document food/fluid/supplement intake    Friction and Shear Interventions: Apply protective barrier, creams and emollients, HOB 30 degrees or less                Problem: Patient Education: Go to Patient Education Activity  Goal: Patient/Family Education  Outcome: Progressing Towards Goal     Problem: Falls - Risk of  Goal: *Absence of Falls  Description: Document Vinh Fall Risk and appropriate interventions in the flowsheet.   Outcome: Progressing Towards Goal  Note: Fall Risk Interventions:                 Elimination Interventions: Call light in reach, Toileting schedule/hourly rounds              Problem: Patient Education: Go to Patient Education Activity  Goal: Patient/Family Education  Outcome: Progressing Towards Goal

## 2021-09-17 NOTE — PROGRESS NOTES
Hospitalist Progress Note  Patricia Isaac MD  Answering service: [phone] from in house phone      Date of Service:  2021  NAME:  Ying Phillips  :  1945  MRN:  [id number]    Admission Summary:   75M was on hospice, not eating, cachectic a/w AMS  Interval history / Subjective:   Pt is seen and evaluated at the bedside. Patient unresponsive closely. Some wincing with sternal rub. Limited movements. Covered in [employer]. D/W palliative care team, hospice care team.  D/W patient's POAdaughter, Ms. Carlos Boxer at length. Wants to continue with treating him for his UTI in the light of previous similar episode of UTI and recovery. Does not want to consider and proceed with hospice care at present. As documented in ACP note at length, interested in feeding tubeDobbhoff tube consideration. Assessment & Plan:     Acute metabolic encephalopathy with underlying dementia  Severe symptomatic hypoglycemia  UTI with UCx + Proteus mirabilis and Klebsiella pneumoniae   Diarrhea  Hypernatremia, slowly improving. Sodium 153 on admission. Peaked at 157 on .  144 on   Worsening metabolic acidosis, CO2 downtrending, 20 on admission, 15 today on   Hypocalcemia  Hypoalbuminemia  Hyperphosphatemia, secondary to ANILA  Dehydration  ANILA with likely underlying CKD, unknown baseline. Creatinine 4.01 on admission. US: no obstruction. Significantly elevated LFTs with normal bilirubin, slowly downtrending. Protein calorie malnutrition with cachexia. Unknown BMI with unmeasured height at present  Failure to thrive  Complete opacification of the left hemithorax. Recurrent Hx of UTI  Thrombocytopenia  Chronic anemia, normocytic    --Critically ill patient. --Nephrology on board. Appreciate recommendations  --Palliative care on board. Appreciate recommendations  --Since terminally ill as noted by multiple specialties.   D/W daughter about poor prognosis and recommendations. Aware and wants to continue current aggressive measures. --IV calcium gluconate 2 g x 1  --IV bicarb gtt. in light of worsening acidosis. Defer further adjustment to nephrology. --Continue IV Zosyn. Appropriate with urine culture. --Continue D5 W as per nephrology  --Maintain Annel hugger for hypothermia. --Continue n.p.o. for now with aspiration and fall precautions while patient is stuporous  --Consider Dobbhoff tube placement  --CBC, CMP, lactic acid monitoring  --Poor candidate for dialysis per nephrology. --Serial Accu-Chek monitoring  --Monitor magnesium, calcium, phosphorus closely along with basic panel  --May need GI/hepatology input for abnormal LFTs    Daughter, Ms. Pradeep Marshall updated on phone. She came by yesterday and saw patient with nurse in the ED when patient was awake with eyes open. Current clinical condition updates given to her. Answered all her questions and concerns. Code status: DNR  DVT prophylaxis: SCD  Care Plan discussed with: Patient/Family and Nurse  Disposition: >48 hours     Hospital Problems  Never Reviewed        Codes Class Noted POA    Hypothermia ICD-10-CM: T68. XXXA  ICD-9-CM: 991.6  9/13/2021 Unknown        Hypoglycemia ICD-10-CM: E16.2  ICD-9-CM: 251.2  9/13/2021 Unknown        Elevated liver enzymes ICD-10-CM: R74.8  ICD-9-CM: 790.5  9/13/2021 Unknown        Acute metabolic encephalopathy XRQ-83-JR: G93.41  ICD-9-CM: 348.31  9/13/2021 Unknown            Review of Systems:   Pertinent items are mentioned in interval history. Vital Signs:    Last 24hrs VS reviewed since prior progress note.  Most recent are:  Visit Vitals  BP (!) 163/84   Pulse 73   Temp (!) 96.7 °F (35.9 °C)   Resp 15   Wt 43.9 kg (96 lb 11.2 oz)   SpO2 99%         Intake/Output Summary (Last 24 hours) at 9/16/2021 2150  Last data filed at 9/16/2021 1852  Gross per 24 hour   Intake 2305 ml   Output 1300 ml   Net 1005 ml        Physical Examination:   Evaluated face to face and examined 09/16/21    General:  Cachectic, very slim/wasted elderly BM. Covered in bruno hugger warmer. Not much responsive. Wincing with sternal rub. Not opening eyes. HEENT: Right eye enucleated. Limited exam with left eye with poor patient cooperation/participation. Mucosa dry. Card:  S1, S2, No murmurs, good peripheral perfusion  Resp:  No accessory muscle use, Good AE, no wheezes. no crepitations  Abd:  Soft, non-tender, non-distended, BS+  Extremities:  No cyanosis or clubbing, no edema. Semiflexed posturing of spine and extremities  Neuro:   Stuporous, not much interactive or responsive. Cachectic wasted. No clear focal neurodeficits  Psych:  unable to assess. Data Review:    Review and/or order of clinical lab test  Review and/or order of tests in the radiology section of [facility]  Review and/or order of tests in the medicine section of     Radiology  US ABD LTD    Result Date: 9/13/2021  1. Normal appearance of the liver. Trace perihepatic ascites versus pleural effusion. 2. Increased echogenicity of the right kidney parenchyma suggests nonspecific medical renal disease. Mild pelviectasis. US RETROPERITONEUM COMP    Result Date: 9/13/2021  1. Increased echogenicity of both kidneys consistent with the clinical history of medical renal parenchymal disease. 2. Pelviectasis on the right. No hydronephrosis on the left. 3. Urinary bladder not visualized. XR CHEST PORT    Result Date: 9/16/2021  Near-total opacification of the left hemithorax with a small amount of aerated lung apically. XR CHEST PORT    Result Date: 9/13/2021  Limited exam 1. Right IJ approach catheter likely terminates in the confluence of brachiocephalic veins. XR CHEST PORT    Result Date: 9/13/2021  Complete opacification of the left hemithorax. Clear right lung.      T  Labs:     Recent Labs     09/16/21  1055 09/14/21  0239   WBC 10.2 6.8   HGB 8.6* 8.2*   HCT 26.2* 25.2*   PLT 50* 66*     Recent Labs 09/16/21  1055 09/15/21  1156 09/14/21  0239    148* 151*   K 3.7 4.0 4.8   * 118* 120*   CO2 15* 17* 18*   * 134* 132*   CREA 4.39* 4.45* 4.27*   GLU 88 134* 75   CA 6.1* 6.2* 6.1*   MG 2.1  --   --    PHOS 6.8*  --   --      Recent Labs     09/16/21  1055 09/15/21  1156 09/14/21  0239   * 299* 569*   * 740* 954*   TBILI 0.2 0.2 0.2   TP 5.4* 5.3* 5.9*   ALB 1.5* 1.5* 1.8*   GLOB 3.9 3.8 4.1*     Recent Labs     09/16/21  1844   INR 1.2*   PTP 12.4*      No results for input(s): FE, TIBC, PSAT, FERR in the last 72 hours. No results found for: FOL, RBCF   No results for input(s): PH, PCO2, PO2 in the last 72 hours. No results for input(s): CPK, CKNDX, TROIQ in the last 72 hours.     No lab exists for component: CPKMB  No results found for: CHOL, CHOLX, CHLST, CHOLV, HDL, HDLP, LDL, LDLC, DLDLP, TGLX, TRIGL, TRIGP, CHHD, CHHDX  Lab Results   Component Value Date/Time    Glucose (POC) 112 09/16/2021 03:44 AM    Glucose (POC) 116 09/15/2021 11:00 PM    Glucose (POC) 108 09/15/2021 07:22 PM    Glucose (POC) 122 (H) 09/15/2021 03:08 PM    Glucose (POC) 103 09/15/2021 11:37 AM     Lab Results   Component Value Date/Time    Color YELLOW/STRAW 09/13/2021 11:37 AM    Appearance CLOUDY (A) 09/13/2021 11:37 AM    Specific gravity 1.015 09/13/2021 11:37 AM    pH (UA) 7.0 09/13/2021 11:37 AM    Protein 300 (A) 09/13/2021 11:37 AM    Glucose Negative 09/13/2021 11:37 AM    Ketone Negative 09/13/2021 11:37 AM    Bilirubin Negative 09/13/2021 11:37 AM    Urobilinogen 0.2 09/13/2021 11:37 AM    Nitrites Positive (A) 09/13/2021 11:37 AM    Leukocyte Esterase MODERATE (A) 09/13/2021 11:37 AM    Epithelial cells FEW 09/13/2021 11:37 AM    Bacteria 4+ (A) 09/13/2021 11:37 AM    WBC 10-20 09/13/2021 11:37 AM    RBC 0-5 09/13/2021 11:37 AM     Medications Reviewed:     Current Facility-Administered Medications   Medication Dose Route Frequency    sodium bicarbonate 150 mEq/1000 mL D5W (premix) IntraVENous CONTINUOUS    dextrose 5% infusion  50 mL/hr IntraVENous CONTINUOUS    piperacillin-tazobactam (ZOSYN) 3.375 g in 0.9% sodium chloride (MBP/ADV) 100 mL MBP  3.375 g IntraVENous Q12H    sodium chloride (NS) flush 5-40 mL  5-40 mL IntraVENous Q8H    sodium chloride (NS) flush 5-40 mL  5-40 mL IntraVENous PRN    acetaminophen (TYLENOL) tablet 650 mg  650 mg Oral Q6H PRN    Or    acetaminophen (TYLENOL) suppository 650 mg  650 mg Rectal Q6H PRN    polyethylene glycol (MIRALAX) packet 17 g  17 g Oral DAILY PRN    ondansetron (ZOFRAN ODT) tablet 4 mg  4 mg Oral Q8H PRN    Or    ondansetron (ZOFRAN) injection 4 mg  4 mg IntraVENous Q6H PRN    glucose chewable tablet 16 g  4 Tablet Oral PRN    dextrose (D50W) injection syrg 12.5-25 g  12.5-25 g IntraVENous PRN    glucagon (GLUCAGEN) injection 1 mg  1 mg IntraMUSCular PRN   ______________________________________________________________________  EXPECTED LENGTH OF STAY: - - -  ACTUAL LENGTH OF STAY:          3               Kenna Garcia MD

## 2021-09-17 NOTE — ACP (ADVANCE CARE PLANNING)
Advance Care Planning     Advance Care Planning (ACP) Physician/NP/PA Conversation      Date of Conversation: 9/13/2021  Conducted with: Patient with Decision Making Capacity and Healthcare Decision Maker: Named in Advance Directive or Healthcare Power of Chantel De La Fuente -daughter Ms. Kailyn Lyman. Healthcare Decision Maker:     Primary Decision Maker: Buffy Harrington" - Daughter - [phone]    Primary Decision Maker: Archibald Peabody - Son  Click here to [address] including selection of the Healthcare Decision Maker Relationship (ie \"Primary\")      Diagnosis:  Acute metabolic encephalopathy with underlying dementia  Severe symptomatic hypoglycemia  UTI with UCx + Proteus mirabilis and Klebsiella pneumoniae 9/13  Diarrhea  Hypernatremia, slowly improving. Sodium 153 on admission. Peaked at 157 on 9/13.  144 on 9/16  Worsening metabolic acidosis, CO2 downtrending, 20 on admission, 15 today on 9/16  Hypocalcemia  Hypoalbuminemia  Hyperphosphatemia, secondary to ANILA  Dehydration  ANILA with likely underlying CKD, unknown baseline. Creatinine 4.01 on admission. US: no obstruction. Significantly elevated LFTs with normal bilirubin, slowly downtrending. Protein calorie malnutrition with cachexia. Unknown BMI with unmeasured height at present  Failure to thrive  Complete opacification of the left hemithorax. Recurrent Hx of UTI  Thrombocytopenia  Chronic anemia, normocytic    Care Preferences:    Hospitalization: \"If your health worsens and it becomes clear that your chance of recovery is unlikely, what would be your preference regarding hospitalization? \"  The patient would prefer hospitalization. Ventilation: \"If you were unable to breathe on your own and your chance of recovery was unlikely, what would be your preference about the use of a ventilator (breathing machine) if it was available to you? \"   The patient would NOT desire the use of a ventilator. Resuscitation:  \"In the event your heart stopped as a result of an underlying serious health condition, would you want attempts to be made to restart your heart, or would you prefer a natural death? \"   No, do NOT attempt to resuscitate. Additional topics discussed: treatment goals, benefit/burden of treatment options, artificial nutrition, ventilation preferences, hospitalization preferences, resuscitation preferences, end of life care preferences (vegetative state/imminent death) and hospice care   DNR confirmed. Willing for nutritional supplementation with tube feeds. Willing to proceed with Dobbhoff tube placement[facility] with recent hospitalization. Does not want to proceed with hospice care team or comfort care at present.     Conversation Outcomes / Follow-Up Plan:   ACP in process - information provided, considering goals and options  Reviewed DNR/DNI and patient confirms current DNR status - completed forms on file (place new order if needed)     Length of Voluntary ACP Conversation in minutes:  20 minutes    Inga Dias MD

## 2021-09-17 NOTE — PROGRESS NOTES
Hospitalist Progress Note  Patricia Isaac MD  Answering service: [phone] from in house phone      Date of Service:  2021  NAME:  Ying Phillips  :  1945  MRN:  [id number]    Admission Summary:   75M was on hospice, not eating, cachectic a/w AMS  Interval history / Subjective:   Pt is seen and evaluated at the bedside. Continue to remain unresponsive. Intermittent fencing. At times open eyes for nursing team without any tracking or purposeful interaction. Labs and vitals noted. No other overnight events or concerns. Not a HD candidate per renal given Debilitated condition. Assessment & Plan:     Acute metabolic encephalopathy with underlying dementia, unchanged  Severe symptomatic hypoglycemia, better with MIVF with D5  UTI with UCx + Proteus mirabilis and Klebsiella pneumoniae , ongoing Abx treatment with IV Zosyn  Diarrhea  Hypernatremia, stable. Sodium 153 on admission. Peaked at 157 on .  146 today. Clinically dry appearing, MIVF ongoing. Worsening metabolic acidosis, CO2 downtrending, 20 on admission, 15 on . Stable at 18 with Bicarb GTT  Hypocalcemia, unchanged with replenishment efforts  Hypoalbuminemia  Hyperphosphatemia, secondary to ANILA, rising level  Dehydration. Ongoing MIVF  ANILA with likely underlying CKD, unknown baseline. Creatinine 4.01 on admission. US: no obstruction. Worsening  Significantly elevated LFTs with normal bilirubin, slowly downtrending. Protein calorie malnutrition with cachexia. Unknown BMI with unmeasured height at present  Failure to thrive  Complete opacification of the left hemithorax. CXR from  noted  Recurrent Hx of UTI  Thrombocytopenia, downtrending  Chronic anemia, normocytic    --Critically ill patient. Seems to be terminally ill. Very high likelihood of poor outcome  --Nephrology on board. Appreciate recommendations  --Palliative care on board.   Appreciate recommendations  --Since terminally ill as noted by multiple specialties. Updated daughter about very poor prognosis and recommendations. --IV calcium gluconate 2 g x 1 on 9/16. Additional similar dose on 9/17  --IV bicarb gtt. in light of worsening acidosis. Defer further adjustment to nephrology. --Continue IV Zosyn. Appropriate with urine culture. --Continue D5 W as per nephrology  --Maintain Annel hugger for hypothermia.  --N.p.o. for now. Aspiration and fall precautions. --Consider TPN in interim while awaiting clinical improvement. --Dobbhoff tube consideration if patient improves drastically. Given the clinical condition, would d/w family about rethinking. Would appreciate palliative care discussion  --CBC, CMP, lactic acid monitoring  --Poor candidate for dialysis per nephrology. --Serial Accu-Chek monitoring  --Monitor magnesium, calcium, phosphorus closely along with basic panel  --May need GI/hepatology input for abnormal LFTs    Continue to remain critically ill. Poor to grim prognosis. Code status: DNR  DVT prophylaxis: SCD  Care Plan discussed with: Patient/Family and Nurse  Disposition: >48 hours     Hospital Problems  Never Reviewed        Codes Class Noted POA    Hypothermia ICD-10-CM: T68. XXXA  ICD-9-CM: 991.6  9/13/2021 Unknown        Hypoglycemia ICD-10-CM: E16.2  ICD-9-CM: 251.2  9/13/2021 Unknown        Elevated liver enzymes ICD-10-CM: R74.8  ICD-9-CM: 790.5  9/13/2021 Unknown        Acute metabolic encephalopathy HDB-30-EL: G93.41  ICD-9-CM: 348.31  9/13/2021 Unknown            Review of Systems:   Pertinent items are mentioned in interval history. Vital Signs:    Last 24hrs VS reviewed since prior progress note.  Most recent are:  Visit Vitals  BP (!) 162/93 (BP 1 Location: Right upper arm, BP Patient Position: At rest)   Pulse 87   Temp 98.4 °F (36.9 °C)   Resp 21   Wt 45.5 kg (100 lb 4.8 oz)   SpO2 97%         Intake/Output Summary (Last 24 hours) at 9/17/2021 0790  Last data filed at 9/17/2021 0407  Gross per 24 hour   Intake    Output 1100 ml   Net -1100 ml        Physical Examination:   Evaluated face to face and examined 09/17/21    General:  Cachectic, very slim/wasted elderly BM. Covered in bruno hugger warmer. Not much responsive. Wincing with sternal rub. Did not open eyes or respond during my interaction and assessment   HEENT: Right eye enucleated. Limited exam with left eye with poor patient cooperation/participation. Mucosa dry. Card:  S1, S2, No murmurs, good peripheral perfusion  Resp:  No accessory muscle use, Good AE, no wheezes. no crepitations  Abd:  Soft, non-tender, non-distended, BS+  Extremities:  No cyanosis or clubbing, no edema. Semiflexed posturing of spine and extremities  Neuro:   Stuporous, not much interactive or responsive. Cachectic wasted. No clear focal neurodeficits  Psych:  unable to assess. Data Review:    Review and/or order of clinical lab test  Review and/or order of tests in the radiology section of [facility]  Review and/or order of tests in the medicine section of     Radiology  US ABD LTD    Result Date: 9/13/2021  1. Normal appearance of the liver. Trace perihepatic ascites versus pleural effusion. 2. Increased echogenicity of the right kidney parenchyma suggests nonspecific medical renal disease. Mild pelviectasis. US RETROPERITONEUM COMP    Result Date: 9/13/2021  1. Increased echogenicity of both kidneys consistent with the clinical history of medical renal parenchymal disease. 2. Pelviectasis on the right. No hydronephrosis on the left. 3. Urinary bladder not visualized. XR CHEST PORT    Result Date: 9/16/2021  Near-total opacification of the left hemithorax with a small amount of aerated lung apically. XR CHEST PORT    Result Date: 9/13/2021  Limited exam 1. Right IJ approach catheter likely terminates in the confluence of brachiocephalic veins.      XR CHEST PORT    Result Date: 9/13/2021  Complete opacification of the left hemithorax. Clear right lung. T  Labs:     Recent Labs     09/17/21  0442 09/16/21  1055   WBC 13.3* 10.2   HGB 8.5* 8.6*   HCT 26.0* 26.2*   PLT 46* 50*     Recent Labs     09/17/21  0442 09/17/21  0400 09/16/21  1055   * 144 144   K 3.6 3.6 3.7   * 114* 116*   CO2 18* 18* 15*   * 126* 128*   CREA 4.42* 4.41* 4.39*   * 152* 88   CA 6.2* 6.2* 6.1*   MG 2.0  --  2.1   PHOS 7.3* 7.2* 6.8*     Recent Labs     09/17/21  0442 09/17/21  0400 09/16/21  1055 09/15/21  1156 09/15/21  1156   ALT  --  177* 215*  --  299*   AP  --  582* 626*  --  740*   TBILI  --  0.2 0.2  --  0.2   TP  --  5.4* 5.4*  --  5.3*   ALB 1.4* 1.4* 1.5*   < > 1.5*   GLOB  --  4.0 3.9  --  3.8    < > = values in this interval not displayed. Recent Labs     09/16/21  1844   INR 1.2*   PTP 12.4*      No results for input(s): FE, TIBC, PSAT, FERR in the last 72 hours. No results found for: FOL, RBCF   No results for input(s): PH, PCO2, PO2 in the last 72 hours. No results for input(s): CPK, CKNDX, TROIQ in the last 72 hours.     No lab exists for component: CPKMB  No results found for: CHOL, CHOLX, CHLST, CHOLV, HDL, HDLP, LDL, LDLC, DLDLP, TGLX, TRIGL, TRIGP, CHHD, CHHDX  Lab Results   Component Value Date/Time    Glucose (POC) 112 09/16/2021 03:44 AM    Glucose (POC) 116 09/15/2021 11:00 PM    Glucose (POC) 108 09/15/2021 07:22 PM    Glucose (POC) 122 (H) 09/15/2021 03:08 PM    Glucose (POC) 103 09/15/2021 11:37 AM     Lab Results   Component Value Date/Time    Color YELLOW/STRAW 09/13/2021 11:37 AM    Appearance CLOUDY (A) 09/13/2021 11:37 AM    Specific gravity 1.015 09/13/2021 11:37 AM    pH (UA) 7.0 09/13/2021 11:37 AM    Protein 300 (A) 09/13/2021 11:37 AM    Glucose Negative 09/13/2021 11:37 AM    Ketone Negative 09/13/2021 11:37 AM    Bilirubin Negative 09/13/2021 11:37 AM    Urobilinogen 0.2 09/13/2021 11:37 AM    Nitrites Positive (A) 09/13/2021 11:37 AM    Leukocyte Esterase MODERATE (A) 09/13/2021 11:37 AM    Epithelial cells FEW 09/13/2021 11:37 AM    Bacteria 4+ (A) 09/13/2021 11:37 AM    WBC 10-20 09/13/2021 11:37 AM    RBC 0-5 09/13/2021 11:37 AM     Medications Reviewed:     Current Facility-Administered Medications   Medication Dose Route Frequency    calcium gluconate injection 2 g  2 g IntraVENous ONCE    sodium bicarbonate 150 mEq/1000 mL D5W (premix)   IntraVENous CONTINUOUS    dextrose 5% infusion  50 mL/hr IntraVENous CONTINUOUS    piperacillin-tazobactam (ZOSYN) 3.375 g in 0.9% sodium chloride (MBP/ADV) 100 mL MBP  3.375 g IntraVENous Q12H    sodium chloride (NS) flush 5-40 mL  5-40 mL IntraVENous Q8H    sodium chloride (NS) flush 5-40 mL  5-40 mL IntraVENous PRN    acetaminophen (TYLENOL) tablet 650 mg  650 mg Oral Q6H PRN    Or    acetaminophen (TYLENOL) suppository 650 mg  650 mg Rectal Q6H PRN    polyethylene glycol (MIRALAX) packet 17 g  17 g Oral DAILY PRN    ondansetron (ZOFRAN ODT) tablet 4 mg  4 mg Oral Q8H PRN    Or    ondansetron (ZOFRAN) injection 4 mg  4 mg IntraVENous Q6H PRN    glucose chewable tablet 16 g  4 Tablet Oral PRN    dextrose (D50W) injection syrg 12.5-25 g  12.5-25 g IntraVENous PRN    glucagon (GLUCAGEN) injection 1 mg  1 mg IntraMUSCular PRN   ______________________________________________________________________  EXPECTED LENGTH OF STAY: - - -  ACTUAL LENGTH OF STAY:          4               Tom Núñez MD

## 2021-09-17 NOTE — PROGRESS NOTES
[facility]   [address] [facility], [address] [place] [address] Rd [address], [facility]  Phone: [phone]   [phone]       Nephrology Progress Note  Barbara Herrera     1945     [id number]  Date of Admission : 9/13/2021 09/17/21    CC: Follow up for ANILA       Assessment and Plan   ANILA :  - Baseline creatinine unknown. was on Bactrim prior to admission.   - Cr stable  - cont fluids  - Not a candidate for dialysis due to severe debilitated condition   - daily labs     Hyponatremia:  -Secondary to dehydration  - cont D5W    Acidosis:  - on bicarb drip     Opacification of left lung    Hypocalcemia:  - IV ca gluconate given this AM    Hx of CVA  Encephalopathy  Dementia  -MX per primary team     Hypoglycemia  Hypothermia  -Likely 2/2 infection     Recent UTI       Interval History:  Seen and examined. Bear hugger in place. Remains obtunded. No changes clinically. UOP 1.1 L. Cr and K stable. Review of Systems: Review of systems not obtained due to patient factors.     Current Medications:   Current Facility-Administered Medications   Medication Dose Route Frequency    calcium gluconate 2 g/100 mL sodium chloride (ISO-OSM)  2 g IntraVENous ONCE    sodium bicarbonate 150 mEq/1000 mL D5W (premix)   IntraVENous CONTINUOUS    dextrose 5% infusion  50 mL/hr IntraVENous CONTINUOUS    piperacillin-tazobactam (ZOSYN) 3.375 g in 0.9% sodium chloride (MBP/ADV) 100 mL MBP  3.375 g IntraVENous Q12H    sodium chloride (NS) flush 5-40 mL  5-40 mL IntraVENous Q8H    sodium chloride (NS) flush 5-40 mL  5-40 mL IntraVENous PRN    acetaminophen (TYLENOL) tablet 650 mg  650 mg Oral Q6H PRN    Or    acetaminophen (TYLENOL) suppository 650 mg  650 mg Rectal Q6H PRN    polyethylene glycol (MIRALAX) packet 17 g  17 g Oral DAILY PRN    ondansetron (ZOFRAN ODT) tablet 4 mg  4 mg Oral Q8H PRN    Or    ondansetron (ZOFRAN) injection 4 mg  4 mg IntraVENous Q6H PRN    glucose chewable tablet 16 g  4 Tablet Oral PRN    dextrose (D50W) injection syrg 12.5-25 g  12.5-25 g IntraVENous PRN    glucagon (GLUCAGEN) injection 1 mg  1 mg IntraMUSCular PRN      No Known Allergies    Objective:  Vitals:    Vitals:    09/17/21 0658 09/17/21 0718 09/17/21 0743 09/17/21 0749   BP: (!) 162/93      Pulse: 87      Resp: 21      Temp:       SpO2: 97%   97%   Weight:  45.5 kg (100 lb 4.8 oz)     Height:   5' 8\" (1.727 m)      Intake and Output:  No intake/output data recorded. 09/15 1901 - 09/17 0700  In: 2305 [I.V.:2305]  Out: 65 [Urine:1700]    Physical Examination:  General: Cachectic and terminal looking  Neck: Supple,no mass palpable  Lungs : Diminished bilaterally  CVS: RRR, s1,s2  Extremities: no Edema  Neurologic: Obtunded, bilateral contractures  Psych: Unable to assess    []    High complexity decision making was performed  []    Patient is at high-risk of decompensation with multiple organ involvement    Lab Data Personally Reviewed: I have reviewed all the pertinent labs, microbiology data and radiology studies during assessment.     Recent Labs     09/17/21 0442 09/17/21  0400 09/16/21  1844 09/16/21  1055 09/15/21  1156   * 144  --  144 148*   K 3.6 3.6  --  3.7 4.0   * 114*  --  116* 118*   CO2 18* 18*  --  15* 17*   * 152*  --  88 134*   * 126*  --  128* 134*   CREA 4.42* 4.41*  --  4.39* 4.45*   CA 6.2* 6.2*  --  6.1* 6.2*   MG 2.0  --   --  2.1  --    PHOS 7.3* 7.2*  --  6.8*  --    ALB 1.4*  1.4* 1.4*  --  1.5* 1.5*   * 177*  --  215* 299*   INR  --   --  1.2*  --   --      Recent Labs     09/17/21 0442 09/16/21  1055   WBC 13.3* 10.2   HGB 8.5* 8.6*   HCT 26.0* 26.2*   PLT 46* 50*     No results found for: SDES  Lab Results   Component Value Date/Time    Culture result: KLEBSIELLA PNEUMONIAE (A) 09/13/2021 11:37 AM    Culture result: PROTEUS MIRABILIS (A) 09/13/2021 11:37 AM    Culture result: NO GROWTH 4 DAYS 09/13/2021 09:42 AM     Recent Results (from the past 24 hour(s))   CBC W/O DIFF Collection Time: 09/16/21 10:55 AM   Result Value Ref Range    WBC 10.2 4.1 - 11.1 K/uL    RBC 3.09 (L) 4.10 - 5.70 M/uL    HGB 8.6 (L) 12.1 - 17.0 g/dL    HCT 26.2 (L) 36.6 - 50.3 %    MCV 84.8 80.0 - 99.0 FL    MCH 27.8 26.0 - 34.0 PG    MCHC 32.8 30.0 - 36.5 g/dL    RDW 14.7 (H) 11.5 - 14.5 %    PLATELET 50 (L) 457 - 400 K/uL    MPV ABNORMAL 8.9 - 12.9 FL    NRBC 0.5 (H) 0  WBC    ABSOLUTE NRBC 0.05 (H) 0.00 - 5.04 K/uL   METABOLIC PANEL, COMPREHENSIVE    Collection Time: 09/16/21 10:55 AM   Result Value Ref Range    Sodium 144 136 - 145 mmol/L    Potassium 3.7 3.5 - 5.1 mmol/L    Chloride 116 (H) 97 - 108 mmol/L    CO2 15 (LL) 21 - 32 mmol/L    Anion gap 13 5 - 15 mmol/L    Glucose 88 65 - 100 mg/dL     (H) 6 - 20 MG/DL    Creatinine 4.39 (H) 0.70 - 1.30 MG/DL    BUN/Creatinine ratio 29 (H) 12 - 20      GFR est AA 16 (L) >60 ml/min/1.73m2    GFR est non-AA 13 (L) >60 ml/min/1.73m2    Calcium 6.1 (LL) 8.5 - 10.1 MG/DL    Bilirubin, total 0.2 0.2 - 1.0 MG/DL    ALT (SGPT) 215 (H) 12 - 78 U/L    AST (SGOT) 333 (H) 15 - 37 U/L    Alk. phosphatase 626 (H) 45 - 117 U/L    Protein, total 5.4 (L) 6.4 - 8.2 g/dL    Albumin 1.5 (L) 3.5 - 5.0 g/dL    Globulin 3.9 2.0 - 4.0 g/dL    A-G Ratio 0.4 (L) 1.1 - 2.2     MAGNESIUM    Collection Time: 09/16/21 10:55 AM   Result Value Ref Range    Magnesium 2.1 1.6 - 2.4 mg/dL   PHOSPHORUS    Collection Time: 09/16/21 10:55 AM   Result Value Ref Range    Phosphorus 6.8 (H) 2.6 - 4.7 MG/DL   LACTIC ACID    Collection Time: 09/16/21 10:55 AM   Result Value Ref Range    Lactic acid 1.0 0.4 - 2.0 MMOL/L   SAMPLES BEING HELD    Collection Time: 09/16/21  1:12 PM   Result Value Ref Range    SAMPLES BEING HELD 1SST     COMMENT        Add-on orders for these samples will be processed based on acceptable specimen integrity and analyte stability, which may vary by analyte.    PROTHROMBIN TIME + INR    Collection Time: 09/16/21  6:44 PM   Result Value Ref Range    INR 1.2 (H) 0.9 - 1.1      Prothrombin time 12.4 (H) 9.0 - 91.4 sec   METABOLIC PANEL, COMPREHENSIVE    Collection Time: 09/17/21  4:00 AM   Result Value Ref Range    Sodium 144 136 - 145 mmol/L    Potassium 3.6 3.5 - 5.1 mmol/L    Chloride 114 (H) 97 - 108 mmol/L    CO2 18 (L) 21 - 32 mmol/L    Anion gap 12 5 - 15 mmol/L    Glucose 152 (H) 65 - 100 mg/dL     (H) 6 - 20 MG/DL    Creatinine 4.41 (H) 0.70 - 1.30 MG/DL    BUN/Creatinine ratio 29 (H) 12 - 20      GFR est AA 16 (L) >60 ml/min/1.73m2    GFR est non-AA 13 (L) >60 ml/min/1.73m2    Calcium 6.2 (LL) 8.5 - 10.1 MG/DL    Bilirubin, total 0.2 0.2 - 1.0 MG/DL    ALT (SGPT) 177 (H) 12 - 78 U/L    AST (SGOT) 261 (H) 15 - 37 U/L    Alk.  phosphatase 582 (H) 45 - 117 U/L    Protein, total 5.4 (L) 6.4 - 8.2 g/dL    Albumin 1.4 (L) 3.5 - 5.0 g/dL    Globulin 4.0 2.0 - 4.0 g/dL    A-G Ratio 0.4 (L) 1.1 - 2.2     PHOSPHORUS    Collection Time: 09/17/21  4:00 AM   Result Value Ref Range    Phosphorus 7.2 (H) 2.6 - 4.7 MG/DL   RENAL FUNCTION PANEL    Collection Time: 09/17/21  4:42 AM   Result Value Ref Range    Sodium 146 (H) 136 - 145 mmol/L    Potassium 3.6 3.5 - 5.1 mmol/L    Chloride 115 (H) 97 - 108 mmol/L    CO2 18 (L) 21 - 32 mmol/L    Anion gap 13 5 - 15 mmol/L    Glucose 157 (H) 65 - 100 mg/dL     (H) 6 - 20 MG/DL    Creatinine 4.42 (H) 0.70 - 1.30 MG/DL    BUN/Creatinine ratio 29 (H) 12 - 20      GFR est AA 16 (L) >60 ml/min/1.73m2    GFR est non-AA 13 (L) >60 ml/min/1.73m2    Calcium 6.2 (LL) 8.5 - 10.1 MG/DL    Phosphorus 7.3 (H) 2.6 - 4.7 MG/DL    Albumin 1.4 (L) 3.5 - 5.0 g/dL   CBC W/O DIFF    Collection Time: 09/17/21  4:42 AM   Result Value Ref Range    WBC 13.3 (H) 4.1 - 11.1 K/uL    RBC 3.05 (L) 4.10 - 5.70 M/uL    HGB 8.5 (L) 12.1 - 17.0 g/dL    HCT 26.0 (L) 36.6 - 50.3 %    MCV 85.2 80.0 - 99.0 FL    MCH 27.9 26.0 - 34.0 PG    MCHC 32.7 30.0 - 36.5 g/dL    RDW 14.9 (H) 11.5 - 14.5 %    PLATELET 46 (LL) 408 - 400 K/uL    NRBC 0.2 (H) 0  WBC ABSOLUTE NRBC 0.03 (H) 0.00 - 0.01 K/uL   MAGNESIUM    Collection Time: 09/17/21  4:42 AM   Result Value Ref Range    Magnesium 2.0 1.6 - 2.4 mg/dL   HEPATIC FUNCTION PANEL    Collection Time: 09/17/21  4:42 AM   Result Value Ref Range    Protein, total 5.4 (L) 6.4 - 8.2 g/dL    Albumin 1.4 (L) 3.5 - 5.0 g/dL    Globulin 4.0 2.0 - 4.0 g/dL    A-G Ratio 0.4 (L) 1.1 - 2.2      Bilirubin, total 0.2 0.2 - 1.0 MG/DL    Bilirubin, direct <0.1 0.0 - 0.2 MG/DL    Alk. phosphatase 616 (H) 45 - 117 U/L    AST (SGOT) 271 (H) 15 - 37 U/L    ALT (SGPT) 180 (H) 12 - 78 U/L   GLUCOSE, POC    Collection Time: 09/17/21  7:57 AM   Result Value Ref Range    Glucose (POC) 91 65 - 117 mg/dL    Performed by Jina Wray            Total time spent with patient:  xxx   min. Care Plan discussed with:  Patient     Family      RN      Consulting Physician [facility],         I have reviewed the flowsheets. Chart and Pertinent Notes have been reviewed. No change in PMH ,family and social history from Consult note.       May Stanley MD

## 2021-09-18 NOTE — PROGRESS NOTES
Labs reviewed and stable  Cont present care  Not a dialysis candidate  Will f/u on Monday  Call with any issues over the weekend        Leslie Wright MD  [facility]   [address] [address]  Phone - [phone]   Fax - [phone]  [email or web address]

## 2021-09-18 NOTE — PROGRESS NOTES
Bedside and Verbal shift change report given to Oscar Bird (oncoming nurse) by Sb Kapadia RN (offgoing nurse).  Report included the following information SBAR, Kardex, Intake/Output, MAR, Med Rec Status and Cardiac Rhythm NS

## 2021-09-19 NOTE — DISCHARGE SUMMARY
[address] [facility]  Hospitalist Group     Death Discharge Summary   PATIENT ID: Gaylene Angelucci  MRN: [id number]   YOB: 1945    DATE OF ADMISSION: 9/13/2021  5:49 AM    PRIMARY CARE PROVIDER: Jose M Judd MD   ATTENDING PHYSICIAN: Kenna Garcia MD  CONSULTATIONS:   IP CONSULT TO NEPHROLOGY    PROCEDURES/SURGERIES:   * No surgery found *    REASON FOR ADMISSION: <principal problem not specified>   AMS, hypoglycemia    HOSPITAL PROBLEM LIST:  Patient Active Problem List   Diagnosis Code    Hypothermia T68. Samir Doom    Hypoglycemia E16.2    Elevated liver enzymes R74.8    Acute metabolic encephalopathy I82.82    Palliative care encounter Z51.5    Goals of care, counseling/discussion Z71.89    DNR (do not resuscitate) discussion Z71.89    Failure to thrive in adult R62.7    End of life care Z51.5       DATE AND TIME OF DEATH: 9/19/2021, 3:05am     CODE STATUS AT DISCHARGE:   Full Code   x DNR    Partial    Comfort Care     DISCHARGE DIAGNOSES:   Acute metabolic encephalopathy with underlying dementia, unchanged  Acute hypoxic respiratory failure, currently on 5 LPM O2 NC. O2 sats dropping into 70s per nursing team.  Stable with 5 LPM O2 NC and with O2 supplementation through mouth  Severe symptomatic hypoglycemia, better with MIVF with D5  Acute renal failure with possible underlying CKD. Creatinine 4.01 on admission. Peaked at 4.42 on 9/17, 4.29 on 9/18. UTI with UCx + Proteus mirabilis and Klebsiella pneumoniae 9/13, ongoing Abx treatment with IV Zosyn  Diarrhea  Hypernatremia, stable. Sodium 153 on admission. Peaked at 157 on 9/13.  144 on 9/18  Worsening metabolic acidosis, CO2 downtrending, 20 on admission, 15 on 9/16. Stable at 20 with Bicarb GTT 9/18  Hypocalcemia, improved from 6.2-6.7 on 9/18 with replenishment  Hypoalbuminemia  Hyperphosphatemia, secondary to ANILA, rising level  Dehydration.  Ongoing MIVF  Significantly elevated LFTs with normal bilirubin, still much elevated but continue to downtrend slowly 9/18. Protein calorie malnutrition with cachexia. Unknown BMI with unmeasured height at present  Failure to thrive, TPN initiation 9/18. Patient has central lineright IJ  Complete opacification of the left hemithorax. CXR from 9/16 noted, repeat CXR 9/18 for hypoxia worsening, unchanged  Recurrent Hx of UTI  Thrombocytopenia, downtrending  Chronic anemia, normocytic    Brief HPI and Hospital Course:      HISTORY OF PRESENT ILLNESS   Patient unable to provide hx. History obtained from chart review and speaking with daughter Nav Phillips on the phone #[phone]  This is a 67-year-old gentleman was picked up by EMS from home for altered mental status. Blood sugar was 24 he was said to be treated with IM glucagon. He was lethargic and unresponsive, he was bagged by EMS, upon transfer to ED stretcher oxygen sats with greater than 90. Initial Accu-Chek showed blood sugar of 17 who was treated and blood sugar steadily jenni to 25 than 45 and 153. He was also hypothermic and hypotensive. Hold the Lasix. Patient is currently on D5 drip. The rest of the blood work showed hyper natremia, elevated BUN and creatinine, elevated liver enzymes, normal troponin.     Patient has a past medical history significant for hypertension, diabetes, dysphagia and aspiration, failure to thrive, right-sided weakness with contracture, blindness with right eye inoculation. He [place] [facility] [place] for electrolyte derangements dysphagia, aspiration. His daughter says he was diagnosed with UTI 4 days ago for which he is taking Bactrim. She said he started with diarrhea as he always does when he goes on antibiotics became very weak since yesterday and stopped eating and taking his medications. Patient is not on antidiabetic medications as he was taken off of recently.   His daughters attributes his current illness to the diarrhea and failure to take orally induced by the antibiotics.     At baseline he answers questions and say some words but does not engage in coherent conversation, he is contracted needs assistance with transfer. Assessment & Plan:      Acute metabolic encephalopathy with underlying dementia, unchanged  Acute hypoxic respiratory failure, currently on 5 LPM O2 NC. O2 sats dropping into 70s per nursing team.  Stable with 5 LPM O2 NC and with O2 supplementation through mouth  Severe symptomatic hypoglycemia, better with MIVF with D5  UTI with UCx + Proteus mirabilis and Klebsiella pneumoniae 9/13, ongoing Abx treatment with IV Zosyn  Diarrhea  Hypernatremia, stable. Sodium 153 on admission. Peaked at 157 on 9/13.  144 on 9/18  Worsening metabolic acidosis, CO2 downtrending, 20 on admission, 15 on 9/16. Stable at 20 with Bicarb GTT 9/18  Hypocalcemia, improved from 6.2-6.7 on 9/18 with replenishment  Hypoalbuminemia  Hyperphosphatemia, secondary to ANILA, rising level  Dehydration. Ongoing MIVF  ANILA with likely underlying CKD, unknown baseline. Creatinine 4.01 on admission. US: no obstruction. No better at 4.29 on 9/18, from 4.42 peak on 9/17  Significantly elevated LFTs with normal bilirubin, still much elevated but continue to downtrend slowly 9/18. Protein calorie malnutrition with cachexia. Unknown BMI with unmeasured height at present  Failure to thrive, TPN initiation 9/18. Patient has central lineright IJ  Complete opacification of the left hemithorax. CXR from 9/16 noted, repeat CXR 9/18 for hypoxia worsening, unchanged  Recurrent Hx of UTI  Thrombocytopenia, downtrending  Chronic anemia, normocytic     --Critically ill patient. Seems to be terminally ill. Very high likelihood of poor outcome  --Nephrology on board. Appreciate recommendations  --Palliative care on board. Appreciate recommendations  --Since terminally ill as noted by multiple specialties,, updated daughter about very poor prognosis and recommendations.     --IV calcium gluconate 2 g x 1 on 9/16. Additional similar dose on 9/17, also 9/18  --IV bicarb gtt. in light of worsening acidosis. Defer further adjustment to nephrology. --Initiate TPN for nutrition team recommendations. D/W pharmacy team 9/18  --Close CBC, CMP, magnesium, phosphorus monitoring daily while on TPN or as appropriate  --Continue IV Zosyn. Appropriate with urine culture. --Continue D5 W as per nephrology. DC after initiation of TPN 9/18  --Maintain Annel hugger for hypothermia as needed. Discontinued on 9/18  --NPO for now with AMS. Aspiration and fall precautions. --Dobbhoff tube consideration if patient improves drastically. Given the clinical condition, would d/w family about rethinking. Would appreciate palliative care discussion  --CBC, CMP, lactic acid monitoring  --Not a candidate for dialysis per nephrology. --Serial Accu-Chek monitoring  --Monitor magnesium, calcium, phosphorus closely along with basic panel  --May need GI/hepatology input for abnormal LFTs     Critically ill. Grim prognosis. Code status: DNR  DVT prophylaxis: SCD          On exam, no heart sounds or breath sounds were noted after 1 minute of auscultation. Pupils were fixed and dilated without pupillary light reflex. Patient was pronounced dead on 9/19/2021  at 3:05am.    Cause of Death:  Immediate:   Acute on chronic renal failure   Acute hypoxic respiratory failure    Events related to death:  Was code called: NO   notified: NO  Family notified: Carlita Gravely  Autopsy requested: NO  Death certificate completed: NO  Code Status Prior to Death:  DNR    PHYSICAL EXAMINATION AT DISCHARGE:  GEN: No response to verbal or tactile stimuli  HEENT: Pupils fixed, dilated  CV: No cardiac activity or heart sounds appreciated. No carotid pulse. PULM: No respiratory effort or spontaneous respirations. Ext: No peripheral pulses.       Signed:   Joselo Cruz MD  Date of Service:  9/19/2021  11:21 PM

## 2021-09-19 NOTE — PROGRESS NOTES
Hospitalist Progress Note  Giovanni Neri MD  Answering service: [phone] from in house phone      Date of Service:  2021  NAME:  Aylin Mendez  :  1945  MRN:  [id number]    Admission Summary:   75M was on hospice, not eating, cachectic a/w AMS  Interval history / Subjective:   Pt is seen and evaluated at the bedside. Looks somewhat better but otherwise unchanged, not much responsive. Intermittently opens eyes per nursing team.  No tracking or purposeful activity/movements for interaction. Hemodynamically stable with vitals. Off Annel hugger since late morning. D/W pharmacy team for TPN. Increased O2 requirement to 5 LPM.  D/W nursing team to change nasal cannula to oxygen mask/Ventimask given patient having mouth breathing. Assessment & Plan:     Acute metabolic encephalopathy with underlying dementia, unchanged  Acute hypoxic respiratory failure, currently on 5 LPM O2 NC. O2 sats dropping into 70s per nursing team.  Stable with 5 LPM O2 NC and with O2 supplementation through mouth  Severe symptomatic hypoglycemia, better with MIVF with D5  UTI with UCx + Proteus mirabilis and Klebsiella pneumoniae , ongoing Abx treatment with IV Zosyn  Diarrhea  Hypernatremia, stable. Sodium 153 on admission. Peaked at 157 on .  144 on   Worsening metabolic acidosis, CO2 downtrending, 20 on admission, 15 on . Stable at 20 with Bicarb GTT   Hypocalcemia, improved from 6.2-6.7 on  with replenishment  Hypoalbuminemia  Hyperphosphatemia, secondary to ANILA, rising level  Dehydration. Ongoing MIVF  ANILA with likely underlying CKD, unknown baseline. Creatinine 4.01 on admission. US: no obstruction. No better at 4.29 on , from 4.42 peak on   Significantly elevated LFTs with normal bilirubin, still much elevated but continue to downtrend slowly . Protein calorie malnutrition with cachexia.   Unknown BMI with unmeasured height at present  Failure to thrive, TPN initiation 9/18. Patient has central lineright IJ  Complete opacification of the left hemithorax. CXR from 9/16 noted, repeat CXR 9/18 for hypoxia worsening, unchanged  Recurrent Hx of UTI  Thrombocytopenia, downtrending  Chronic anemia, normocytic    --Critically ill patient. Seems to be terminally ill. Very high likelihood of poor outcome  --Nephrology on board. Appreciate recommendations  --Palliative care on board. Appreciate recommendations  --Since terminally ill as noted by multiple specialties,, updated daughter about very poor prognosis and recommendations. --IV calcium gluconate 2 g x 1 on 9/16. Additional similar dose on 9/17, also 9/18  --IV bicarb gtt. in light of worsening acidosis. Defer further adjustment to nephrology. --Initiate TPN for nutrition team recommendations. D/W pharmacy team 9/18  --Close CBC, CMP, magnesium, phosphorus monitoring daily while on TPN or as appropriate  --Continue IV Zosyn. Appropriate with urine culture. --Continue D5 W as per nephrology. DC after initiation of TPN 9/18  --Maintain Annel hugger for hypothermia as needed. Discontinued on 9/18  --NPO for now with AMS. Aspiration and fall precautions. --Dobbhoff tube consideration if patient improves drastically. Given the clinical condition, would d/w family about rethinking. Would appreciate palliative care discussion  --CBC, CMP, lactic acid monitoring  --Not a candidate for dialysis per nephrology. --Serial Accu-Chek monitoring  --Monitor magnesium, calcium, phosphorus closely along with basic panel  --May need GI/hepatology input for abnormal LFTs    Continue to remain critically ill. Poor to grim prognosis. Code status: DNR  DVT prophylaxis: SCD  Care Plan discussed with: Patient/Family and Nurse  Disposition: >48 hours     Hospital Problems  Never Reviewed        Codes Class Noted POA    Hypothermia ICD-10-CM: T68. Hernandez Bulloch  ICD-9-CM: 991.6  9/13/2021 Unknown        Hypoglycemia ICD-10-CM: E16.2  ICD-9-CM: 251.2  9/13/2021 Unknown        Elevated liver enzymes ICD-10-CM: R74.8  ICD-9-CM: 790.5  9/13/2021 Unknown        Acute metabolic encephalopathy WTR-54-BP: G93.41  ICD-9-CM: 348.31  9/13/2021 Unknown            Review of Systems:   Pertinent items are mentioned in interval history. Vital Signs:    Last 24hrs VS reviewed since prior progress note. Most recent are:  Visit Vitals  BP (!) 152/102 (BP 1 Location: Right upper arm, BP Patient Position: At rest)   Pulse 82   Temp 97.4 °F (36.3 °C)   Resp 14   Ht 5' 8\" (1.727 m)   Wt 44.5 kg (98 lb 1.7 oz)   SpO2 97%   BMI 14.92 kg/m²         Intake/Output Summary (Last 24 hours) at 9/19/2021 0004  Last data filed at 9/18/2021 1246  Gross per 24 hour   Intake 0 ml   Output 1 ml   Net -1 ml        Physical Examination:   Evaluated face to face and examined 09/19/21    General:  Cachectic, very slim/wasted elderly BM. Off bruno hugger warmer. Unchanged lack of responsiveness. Wincing with sternal rub. Did not open eyes or respond during my interaction and assessment   HEENT: Right eye enucleated. Limited exam with left eye with poor patient cooperation/participation. Mucosa mild dry. Card:  S1, S2, No murmurs, good peripheral perfusion  Resp:  No accessory muscle use, Good AE, no wheezes. no crepitations  Abd:  Soft, non-tender, non-distended, BS+  Extremities:  No cyanosis or clubbing, no edema. Semiflexed posturing of spine and extremities  Neuro:   Stuporous, not much interactive or responsive. Cachectic wasted. No clear focal neurodeficits  Psych:  unable to assess. Data Review:    Review and/or order of clinical lab test  Review and/or order of tests in the radiology section of CPT  Review and/or order of tests in the medicine section of     Radiology  US ABD LTD    Result Date: 9/13/2021  1. Normal appearance of the liver. Trace perihepatic ascites versus pleural effusion.  2. Increased echogenicity of the right kidney parenchyma suggests nonspecific medical renal disease. Mild pelviectasis. US RETROPERITONEUM COMP    Result Date: 9/13/2021  1. Increased echogenicity of both kidneys consistent with the clinical history of medical renal parenchymal disease. 2. Pelviectasis on the right. No hydronephrosis on the left. 3. Urinary bladder not visualized. XR CHEST PORT    Result Date: 9/18/2021  Persistent opacification and volume loss left hemithorax. XR CHEST PORT    Result Date: 9/16/2021  Near-total opacification of the left hemithorax with a small amount of aerated lung apically. XR CHEST PORT    Result Date: 9/13/2021  Limited exam 1. Right IJ approach catheter likely terminates in the confluence of brachiocephalic veins. XR CHEST PORT    Result Date: 9/13/2021  Complete opacification of the left hemithorax. Clear right lung. T  Labs:     Recent Labs     09/18/21 0227 09/17/21 0442   WBC 12.5* 13.3*   HGB 8.7* 8.5*   HCT 26.3* 26.0*   PLT 45* 46*     Recent Labs     09/18/21 0227 09/17/21  0442 09/17/21  0400 09/16/21  1055 09/16/21  1055    146* 144   < > 144   K 3.4* 3.6 3.6   < > 3.7   * 115* 114*   < > 116*   CO2 20* 18* 18*   < > 15*   * 128* 126*   < > 128*   CREA 4.29* 4.42* 4.41*   < > 4.39*   GLU 80 157* 152*   < > 88   CA 6.7* 6.2* 6.2*   < > 6.1*   MG 2.0 2.0  --   --  2.1   PHOS 7.0* 7.3* 7.2*   < > 6.8*    < > = values in this interval not displayed. Recent Labs     09/18/21 0227 09/17/21 0442 09/17/21  0400   * 180* 177*   * 616* 582*   TBILI 0.2 0.2 0.2   TP 5.1* 5.4* 5.4*   ALB 1.4* 1.4*  1.4* 1.4*   GLOB 3.7 4.0 4.0     Recent Labs     09/16/21  1844   INR 1.2*   PTP 12.4*      No results for input(s): FE, TIBC, PSAT, FERR in the last 72 hours. No results found for: FOL, RBCF   No results for input(s): PH, PCO2, PO2 in the last 72 hours. No results for input(s): CPK, CKNDX, TROIQ in the last 72 hours.     No lab exists for component: CPKMB  No results found for: CHOL, CHOLX, CHLST, CHOLV, HDL, HDLP, LDL, LDLC, DLDLP, TGLX, TRIGL, TRIGP, CHHD, CHHDX  Lab Results   Component Value Date/Time    Glucose (POC) 81 09/17/2021 11:13 AM    Glucose (POC) 91 09/17/2021 07:57 AM    Glucose (POC) 112 09/16/2021 03:44 AM    Glucose (POC) 116 09/15/2021 11:00 PM    Glucose (POC) 108 09/15/2021 07:22 PM     Lab Results   Component Value Date/Time    Color YELLOW/STRAW 09/13/2021 11:37 AM    Appearance CLOUDY (A) 09/13/2021 11:37 AM    Specific gravity 1.015 09/13/2021 11:37 AM    pH (UA) 7.0 09/13/2021 11:37 AM    Protein 300 (A) 09/13/2021 11:37 AM    Glucose Negative 09/13/2021 11:37 AM    Ketone Negative 09/13/2021 11:37 AM    Bilirubin Negative 09/13/2021 11:37 AM    Urobilinogen 0.2 09/13/2021 11:37 AM    Nitrites Positive (A) 09/13/2021 11:37 AM    Leukocyte Esterase MODERATE (A) 09/13/2021 11:37 AM    Epithelial cells FEW 09/13/2021 11:37 AM    Bacteria 4+ (A) 09/13/2021 11:37 AM    WBC 10-20 09/13/2021 11:37 AM    RBC 0-5 09/13/2021 11:37 AM     Medications Reviewed:     Current Facility-Administered Medications   Medication Dose Route Frequency    TPN ADULT - CENTRAL   IntraVENous CONTINUOUS    magic mouthwash cpd (without sucralfate)  5 mL Oral TIDAC    hyoscyamine (LEVSIN) 0.125mg/mL solution 125 mcg  125 mcg Oral Q4H PRN    sodium bicarbonate 150 mEq/1000 mL D5W (premix)   IntraVENous CONTINUOUS    piperacillin-tazobactam (ZOSYN) 3.375 g in 0.9% sodium chloride (MBP/ADV) 100 mL MBP  3.375 g IntraVENous Q12H    sodium chloride (NS) flush 5-40 mL  5-40 mL IntraVENous Q8H    sodium chloride (NS) flush 5-40 mL  5-40 mL IntraVENous PRN    acetaminophen (TYLENOL) tablet 650 mg  650 mg Oral Q6H PRN    Or    acetaminophen (TYLENOL) suppository 650 mg  650 mg Rectal Q6H PRN    polyethylene glycol (MIRALAX) packet 17 g  17 g Oral DAILY PRN    ondansetron (ZOFRAN ODT) tablet 4 mg  4 mg Oral Q8H PRN    Or    ondansetron (ZOFRAN) injection 4 mg  4 mg IntraVENous Q6H PRN    glucose chewable tablet 16 g  4 Tablet Oral PRN    dextrose (D50W) injection syrg 12.5-25 g  12.5-25 g IntraVENous PRN    glucagon (GLUCAGEN) injection 1 mg  1 mg IntraMUSCular PRN   ______________________________________________________________________  EXPECTED LENGTH OF STAY: - - -  ACTUAL LENGTH OF STAY:          6               Kenna Garcia MD

## 2021-09-19 NOTE — PROGRESS NOTES
Bedside shift change report given to COMFORT Anderson (oncoming nurse) by Glenroy Coats (offgoing nurse). Report included the following information SBAR, Kardex, ED Summary, Intake/Output, MAR, Recent Results and Med Rec Status.

## 2021-09-19 NOTE — PROGRESS NOTES
Death Pronouncement Note      Events prior to patients death:  Called to bedside at 3:05 AM for unresponsive and pulseless patient. On exam, no heart sounds or breath sounds were noted after 1 minute of auscultation. Pupils were fixed and dilated without pupillary light reflex. Patient was pronounced dead on 9/19/2021  at 3:05    Date/Time of death: 9/19/2021 at 3:05. Cause:  Immediate: Renal failure    Underlying cause:   Hospital Problems  Never Reviewed        Codes Class Noted POA    Hypothermia ICD-10-CM: T68. Letta Rend  ICD-9-CM: 991.6  9/13/2021 Unknown        Hypoglycemia ICD-10-CM: E16.2  ICD-9-CM: 251.2  9/13/2021 Unknown        Elevated liver enzymes ICD-10-CM: R74.8  ICD-9-CM: 790.5  9/13/2021 Unknown        Acute metabolic encephalopathy FRL-71-DJ: G93.41  ICD-9-CM: 348.31  9/13/2021 Unknown                Physician Pronouncing Death: Chitra Ocasio MD      Attending Physician of Record:   Rosemarie Mccormick MD     Events related to death:  Was code called: No   notified: No  Family notified: Yes  Autopsy requested: No  Death certificate completed: No  Code Status Prior to Death: DNR     Discharge summary and death certificate will be completed by: Rosemarie Mccormick MD    Date of Service:  9/19/2021

## 2021-09-19 NOTE — PROGRESS NOTES
S: RRT called for patient because of worsening respiratory distress. Patient with multiple medical problems once on hospice which was revoked by family member and is now DNR. Patient has not had any significant improvement since admission and overall prognosis is very poor despite present aggressive treatment. Chest x-ray done yesterday morning showed persistent opacification and volume loss left hemithorax. Patient is on Zosyn for suspected bacterial pneumonia, Covid are negative and nephrologist on board for acute on chronic kidney disease. O:HEENT: Normocephalic, atraumatic. Chest: Markedly reduced breath sounds left lung field, few expiratory wheezing and crackles right lung field. Heart: Normal S1 and S2, no clinically appreciable murmur. Abdomen: Soft, nontender normal bowel sounds. CNS: Lethargic, contraction of upper extremities  Extremities: Edema 2+ pulses 2+ bilaterally    A/P  Acute respiratory failure with hypoxia: This is multifactorial in terms of etiology including persistent opacification and volume loss left hemithorax. Patient prognosis is very poor. Patient family call in the presence of the patient nurse, update was provided and the poor prognosis was discussed. Patient is on high flow oxygen and nonrebreathing mask with O2 sat of 70%. Patient cannot tolerate BiPAP as per respiratory therapist.  Will obtain a noncontrast CT scan of the chest whenever the patient is stable to have the test done. Intensivist reconsulted for further recommendation in terms of management of the patient acute respiratory failure with hypoxia.

## 2021-09-19 NOTE — CONSULTS
Critical care team was called to evaluate patient for possible admission to ICU. Patient was on hospice/palliative care at home and this was revoked and patient was brought to the hospital today. Currently the patient is in the active stages of death, with oxygen saturations of 65% on 100% high flow nasal cannula oxygen and also wearing a 100% non-rebreather mask. Patient has agonal respirations and is minimally responsive. At this time, he is maxed on therapy. Given his DNR/DNI status, admission to ICU would not add any additional benefit. He is DNR/DNI and receiving proper care per family wishes at this time. Family is aware that patient is actively dying and they are en route to the hospital at this time. Should the family choose to change code status, intubation and ICU admission would then be warranted.         Kenna Arzate [facility]     Critical Care Medicine  [employer]

## 2021-09-19 NOTE — PROGRESS NOTES
0000: RRT called D/T patient desating in the 70'as. Hi flow and non rebreather were placed on patient but he did not respond to O2 therapy. O2 sats remaining in the 70%. Patient unresponsive. 0020: Called suzy Otoole advise patient O2 levels are in the 70's and he is not responding to O2 therapy. Sydnie stated she will come to hospital to be with her father.    12. Patient became unresponsive & pulseless. Suzy Otoole was not at bedside. She had stepped out for a few minutes.

## 2021-09-19 NOTE — PROGRESS NOTES
responded to pt death in room 401. Family was present before pt passed.  talked with pt's RN. Please contact [phone] [address] [facility] for further support.      [address] [place], M[place]   [phone])